# Patient Record
Sex: FEMALE | Race: WHITE | NOT HISPANIC OR LATINO | Employment: OTHER | URBAN - METROPOLITAN AREA
[De-identification: names, ages, dates, MRNs, and addresses within clinical notes are randomized per-mention and may not be internally consistent; named-entity substitution may affect disease eponyms.]

---

## 2023-07-03 LAB
ALANINE AMINOTRANSFERASE (SGPT) (U/L) IN SER/PLAS: 12 U/L (ref 7–45)
ALBUMIN (G/DL) IN SER/PLAS: 4.6 G/DL (ref 3.4–5)
ALKALINE PHOSPHATASE (U/L) IN SER/PLAS: 62 U/L (ref 33–136)
ANION GAP IN SER/PLAS: 11 MMOL/L (ref 10–20)
ASPARTATE AMINOTRANSFERASE (SGOT) (U/L) IN SER/PLAS: 23 U/L (ref 9–39)
BILIRUBIN TOTAL (MG/DL) IN SER/PLAS: 0.6 MG/DL (ref 0–1.2)
CALCIDIOL (25 OH VITAMIN D3) (NG/ML) IN SER/PLAS: 83 NG/ML
CALCIUM (MG/DL) IN SER/PLAS: 10 MG/DL (ref 8.6–10.3)
CARBON DIOXIDE, TOTAL (MMOL/L) IN SER/PLAS: 35 MMOL/L (ref 21–32)
CHLORIDE (MMOL/L) IN SER/PLAS: 93 MMOL/L (ref 98–107)
CHOLESTEROL (MG/DL) IN SER/PLAS: 147 MG/DL (ref 0–199)
CHOLESTEROL IN HDL (MG/DL) IN SER/PLAS: 53.4 MG/DL
CHOLESTEROL/HDL RATIO: 2.8
CREATININE (MG/DL) IN SER/PLAS: 0.78 MG/DL (ref 0.5–1.05)
ESTIMATED AVERAGE GLUCOSE FOR HBA1C: 146 MG/DL
GFR FEMALE: 81 ML/MIN/1.73M2
GLUCOSE (MG/DL) IN SER/PLAS: 106 MG/DL (ref 74–99)
HEMOGLOBIN A1C/HEMOGLOBIN TOTAL IN BLOOD: 6.7 %
LDL: 69 MG/DL (ref 0–99)
POTASSIUM (MMOL/L) IN SER/PLAS: 4.7 MMOL/L (ref 3.5–5.3)
PROTEIN TOTAL: 7.7 G/DL (ref 6.4–8.2)
SODIUM (MMOL/L) IN SER/PLAS: 134 MMOL/L (ref 136–145)
THYROTROPIN (MIU/L) IN SER/PLAS BY DETECTION LIMIT <= 0.05 MIU/L: 1.29 MIU/L (ref 0.44–3.98)
THYROXINE (T4) FREE (NG/DL) IN SER/PLAS: 0.97 NG/DL (ref 0.61–1.12)
TRIGLYCERIDE (MG/DL) IN SER/PLAS: 122 MG/DL (ref 0–149)
UREA NITROGEN (MG/DL) IN SER/PLAS: 15 MG/DL (ref 6–23)
VLDL: 24 MG/DL (ref 0–40)

## 2023-08-02 LAB
ALBUMIN (G/DL) IN SER/PLAS: 4.6 G/DL (ref 3.4–5)
ANION GAP IN SER/PLAS: 12 MMOL/L (ref 10–20)
CALCIUM (MG/DL) IN SER/PLAS: 10 MG/DL (ref 8.6–10.3)
CARBON DIOXIDE, TOTAL (MMOL/L) IN SER/PLAS: 31 MMOL/L (ref 21–32)
CHLORIDE (MMOL/L) IN SER/PLAS: 98 MMOL/L (ref 98–107)
CREATININE (MG/DL) IN SER/PLAS: 0.79 MG/DL (ref 0.5–1.05)
GFR FEMALE: 80 ML/MIN/1.73M2
GLUCOSE (MG/DL) IN SER/PLAS: 111 MG/DL (ref 74–99)
PHOSPHATE (MG/DL) IN SER/PLAS: 4.9 MG/DL (ref 2.5–4.9)
POTASSIUM (MMOL/L) IN SER/PLAS: 4.8 MMOL/L (ref 3.5–5.3)
SODIUM (MMOL/L) IN SER/PLAS: 136 MMOL/L (ref 136–145)
UREA NITROGEN (MG/DL) IN SER/PLAS: 11 MG/DL (ref 6–23)

## 2024-01-03 ENCOUNTER — PATIENT OUTREACH (OUTPATIENT)
Dept: CARDIOLOGY | Facility: CLINIC | Age: 74
End: 2024-01-03
Payer: MEDICARE

## 2024-01-04 PROBLEM — E55.9 VITAMIN D DEFICIENCY: Status: ACTIVE | Noted: 2024-01-04

## 2024-01-04 PROBLEM — E05.20: Status: ACTIVE | Noted: 2024-01-04

## 2024-01-04 PROBLEM — I50.32 CHRONIC DIASTOLIC CONGESTIVE HEART FAILURE (MULTI): Status: ACTIVE | Noted: 2024-01-04

## 2024-01-04 PROBLEM — Z97.4 HEARING AID WORN: Status: ACTIVE | Noted: 2024-01-04

## 2024-01-04 PROBLEM — K21.9 GERD (GASTROESOPHAGEAL REFLUX DISEASE): Status: ACTIVE | Noted: 2024-01-04

## 2024-01-04 PROBLEM — M85.88 OSTEOPENIA OF LUMBAR SPINE: Status: ACTIVE | Noted: 2024-01-04

## 2024-01-04 PROBLEM — E53.8 VITAMIN B12 DEFICIENCY: Status: ACTIVE | Noted: 2024-01-04

## 2024-01-04 PROBLEM — E03.9 HYPOTHYROIDISM: Status: ACTIVE | Noted: 2024-01-04

## 2024-01-04 PROBLEM — R42 DIZZINESS: Status: ACTIVE | Noted: 2024-01-04

## 2024-01-04 PROBLEM — E78.2 MIXED HYPERLIPIDEMIA: Status: ACTIVE | Noted: 2024-01-04

## 2024-01-04 PROBLEM — I07.1 MODERATE TRICUSPID REGURGITATION: Status: ACTIVE | Noted: 2024-01-04

## 2024-01-04 PROBLEM — H92.09 EAR PAIN: Status: ACTIVE | Noted: 2024-01-04

## 2024-01-04 PROBLEM — D50.9 IRON DEFICIENCY ANEMIA: Status: ACTIVE | Noted: 2024-01-04

## 2024-01-04 PROBLEM — R73.9 HYPERGLYCEMIA: Status: ACTIVE | Noted: 2024-01-04

## 2024-01-04 PROBLEM — E11.40 CONTROLLED TYPE 2 DIABETES MELLITUS WITH DIABETIC NEUROPATHY, WITHOUT LONG-TERM CURRENT USE OF INSULIN (MULTI): Status: ACTIVE | Noted: 2024-01-04

## 2024-01-04 PROBLEM — M81.0 OSTEOPOROSIS: Status: ACTIVE | Noted: 2024-01-04

## 2024-01-04 PROBLEM — E04.1 THYROID CYST: Status: ACTIVE | Noted: 2024-01-04

## 2024-01-04 PROBLEM — R94.31 ABNORMAL EKG: Status: ACTIVE | Noted: 2024-01-04

## 2024-01-04 PROBLEM — I34.0 MODERATE MITRAL REGURGITATION: Status: ACTIVE | Noted: 2024-01-04

## 2024-01-04 PROBLEM — H91.90 HEARING LOSS: Status: ACTIVE | Noted: 2024-01-04

## 2024-01-04 PROBLEM — H66.002 NON-RECURRENT ACUTE SUPPURATIVE OTITIS MEDIA OF LEFT EAR WITHOUT SPONTANEOUS RUPTURE OF TYMPANIC MEMBRANE: Status: ACTIVE | Noted: 2024-01-04

## 2024-01-04 PROBLEM — I25.10 CORONARY ARTERY DISEASE: Status: ACTIVE | Noted: 2024-01-04

## 2024-01-04 PROBLEM — E87.5 HYPERKALEMIA: Status: ACTIVE | Noted: 2024-01-04

## 2024-01-04 PROBLEM — I48.0 PAROXYSMAL ATRIAL FIBRILLATION (MULTI): Status: ACTIVE | Noted: 2024-01-04

## 2024-01-04 PROBLEM — E04.1 THYROID NODULE: Status: ACTIVE | Noted: 2024-01-04

## 2024-01-05 NOTE — PROGRESS NOTES
Subjective   Patient ID: Reyna Jones is a 73 y.o. female who presents for New Patient Visit, Shortness of Breath, COPD, and Atrial Fibrillation. Patient's daughter is with her today.     HPI   Patient is here to establish care. She was last seen by Dr. Narvaez on 11/16/2022.     Was admitted into the hospital on 12/28. She went in for elevated heart rate. She states that some of her medications were changed at the hospital. Since being discharged, she has to push herself more to breathe, speak, etc. She uses a nebulizer 3-4 times daily and was told to use her inhaler afterwards.     Question about seeing a lung specialist. She was a smoker for 35 years and smoked 1 PPD. She quit in 2017 then started smoking again a few years after.     She mentions that she has felt like her tongue is burnt for the past few days.     Review of Systems  Except positives as noted in the CC & HPI      Constitutional: Denies fevers, chills, night sweats, fatigue, weight changes, change in appetite    Eyes: Denies blurry vision, double vision    ENT: Denies otalgia, trouble hearing, tinnitus, vertigo, nasal congestion, rhinorrhea, sore throat    Neck: Denies swelling, masses    Cardiovascular: Denies chest pain, palpitations, edema, orthopnea, syncope    Respiratory: Denies dyspnea, cough, wheezing, postural nocturnal dyspnea    Gastrointestinal: Denies abdominal pain, nausea, vomiting, diarrhea, constipation, melena, hematochezia    Genitourinary: Denies dysuria, hematuria, frequency, urgency    Musculoskeletal: Denies back pain, neck pain, arthralgias, myalgias    Integumentary: Denies skin lesions, rashes, masses    Neurological: Denies dizziness, headaches, confusion, limb weakness, paresthesias, syncope, convulsions    Psychiatric: Denies depression, anxiety, homicidal ideations, suicidal ideations, sleep disturbances    Endocrine: Denies polyphagia, polydipsia, polyuria, weakness, hair thinning, heat intolerance, cold  "intolerance, weight changes    Heme/Lymph: Denies easy bruising, easy bleeding, swollen glands    Objective   /67 (BP Location: Left arm, BP Cuff Size: Adult)   Pulse 65   Temp 36.5 °C (97.7 °F) (Temporal)   Ht 1.594 m (5' 2.75\")   Wt 62.5 kg (137 lb 12.8 oz)   SpO2 97%   BMI 24.61 kg/m²     Physical Exam  90/56 on recheck of BP in the right arm.     Gen. Appearance - well-developed, well-nourished, 73 y.o., White female in no acute distress.     Skin - warm, pink and dry without rash or concerning lesions.     Mental Status - alert and oriented times 3. Normal mood and affect appropriate to mood.     Mouth - pharynx is pink without exudates. Dentition is normal appearing. Tongue and uvula move in the midline. Some white spots around the perimeter of the tongue.     Neck - supple without lymphadenopathy. Carotid pulses are normal without bruits. Thyroid is normal in midline without nodules.    Chest - lungs are clear to auscultation without rales, rhonchi or wheezes. Diminished breath sounds at the bases bilaterally.     Heart - regular, rate, and rhythm without murmurs, rubs or gallops.     Abdomen - soft, flat, nontender, nondistended. No masses, hepatomegaly or splenomegaly is noted. No rebound, rigidity or guarding is noted. Bowel sounds are normoactive.     Extremities - no cyanosis, clubbing or edema. Pedal pulses are 2+ normal at the dorsalis pedis and posterior tibial pulses bilaterally.     Neurological - cranial nerves II through XII are grossly intact. Motor strength 5/5 at all fours.     Assessment/Plan   1. Encounter to establish care        2. Controlled type 2 diabetes mellitus with diabetic neuropathy, without long-term current use of insulin (CMS/HCC)  Follow Up In [facility] - PCP - Established      3. Oral thrush  clotrimazole (Mycelex) 10 mg dinah      4. Panlobular emphysema (CMS/HCC)  Referral to Pulmonology      5. Acquired hypothyroidism  Follow Up In [facility] - PCP - Established      6. Mixed hyperlipidemia  Follow Up In [facility] - PCP - Established      7. Paroxysmal atrial fibrillation (CMS/HCC)        8. Gastroesophageal reflux disease without esophagitis        9. Osteopenia of lumbar spine        10. Body mass index (BMI) of 24.0 to 24.9 in adult        11. Encounter for screening mammogram for malignant neoplasm of breast  BI mammo bilateral screening tomosynthesis      12. Chronic diastolic congestive heart failure (CMS/HCC)        Patient will continue on a diabetic, low-cholesterol diet and weight reduction. Exercise as tolerated. She will continue medications as prescribed. Follow-up in 3 month(s) otherwise as needed.     Patient was started on:   Clotrimazole 10 mg Anny, DISSOLVE IN MOUTH FIVE TIMES DAILY FOR 14 DAYS     Rx(s) sent to pharmacy.     Mammogram ordered for yearly breast cancer screening.     Refer patient to Dr. Ovalles for further evaluation and treatment of COPD.     Patient is to follow up with Dr. Mobley (cardio), Dr. Sanon (endo) as scheduled.     Consider PFT in the future. May be done by Dr. Ovalles.         Brock Attestation  By signing my name below, IDionne Scribe   attest that this documentation has been prepared under the direction and in the presence of Jeffery Ayoub MD.

## 2024-01-09 ENCOUNTER — OFFICE VISIT (OUTPATIENT)
Dept: PRIMARY CARE | Facility: CLINIC | Age: 74
End: 2024-01-09
Payer: MEDICARE

## 2024-01-09 VITALS
HEIGHT: 63 IN | HEART RATE: 65 BPM | SYSTOLIC BLOOD PRESSURE: 102 MMHG | WEIGHT: 137.8 LBS | OXYGEN SATURATION: 97 % | TEMPERATURE: 97.7 F | BODY MASS INDEX: 24.41 KG/M2 | DIASTOLIC BLOOD PRESSURE: 67 MMHG

## 2024-01-09 DIAGNOSIS — E03.9 ACQUIRED HYPOTHYROIDISM: ICD-10-CM

## 2024-01-09 DIAGNOSIS — M85.88 OSTEOPENIA OF LUMBAR SPINE: ICD-10-CM

## 2024-01-09 DIAGNOSIS — I48.0 PAROXYSMAL ATRIAL FIBRILLATION (MULTI): ICD-10-CM

## 2024-01-09 DIAGNOSIS — Z12.31 ENCOUNTER FOR SCREENING MAMMOGRAM FOR MALIGNANT NEOPLASM OF BREAST: ICD-10-CM

## 2024-01-09 DIAGNOSIS — E11.40 CONTROLLED TYPE 2 DIABETES MELLITUS WITH DIABETIC NEUROPATHY, WITHOUT LONG-TERM CURRENT USE OF INSULIN (MULTI): ICD-10-CM

## 2024-01-09 DIAGNOSIS — K21.9 GASTROESOPHAGEAL REFLUX DISEASE WITHOUT ESOPHAGITIS: ICD-10-CM

## 2024-01-09 DIAGNOSIS — J43.1 PANLOBULAR EMPHYSEMA (MULTI): ICD-10-CM

## 2024-01-09 DIAGNOSIS — Z76.89 ENCOUNTER TO ESTABLISH CARE: Primary | ICD-10-CM

## 2024-01-09 DIAGNOSIS — E78.2 MIXED HYPERLIPIDEMIA: ICD-10-CM

## 2024-01-09 DIAGNOSIS — I50.32 CHRONIC DIASTOLIC CONGESTIVE HEART FAILURE (MULTI): ICD-10-CM

## 2024-01-09 DIAGNOSIS — B37.0 ORAL THRUSH: ICD-10-CM

## 2024-01-09 PROCEDURE — 3078F DIAST BP <80 MM HG: CPT | Performed by: FAMILY MEDICINE

## 2024-01-09 PROCEDURE — 99215 OFFICE O/P EST HI 40 MIN: CPT | Performed by: FAMILY MEDICINE

## 2024-01-09 PROCEDURE — 3008F BODY MASS INDEX DOCD: CPT | Performed by: FAMILY MEDICINE

## 2024-01-09 PROCEDURE — 3074F SYST BP LT 130 MM HG: CPT | Performed by: FAMILY MEDICINE

## 2024-01-09 PROCEDURE — 1159F MED LIST DOCD IN RCRD: CPT | Performed by: FAMILY MEDICINE

## 2024-01-09 PROCEDURE — 1160F RVW MEDS BY RX/DR IN RCRD: CPT | Performed by: FAMILY MEDICINE

## 2024-01-09 PROCEDURE — 1036F TOBACCO NON-USER: CPT | Performed by: FAMILY MEDICINE

## 2024-01-09 RX ORDER — FUROSEMIDE 40 MG/1
40 TABLET ORAL DAILY
COMMUNITY
End: 2024-01-16 | Stop reason: SDUPTHER

## 2024-01-09 RX ORDER — FLUTICASONE PROPIONATE 50 MCG
1 SPRAY, SUSPENSION (ML) NASAL DAILY
COMMUNITY
End: 2024-03-26 | Stop reason: HOSPADM

## 2024-01-09 RX ORDER — PREDNISONE 10 MG/1
10 TABLET ORAL DAILY
COMMUNITY
End: 2024-01-09 | Stop reason: ALTCHOICE

## 2024-01-09 RX ORDER — LEVALBUTEROL INHALATION SOLUTION 1.25 MG/3ML
1 SOLUTION RESPIRATORY (INHALATION)
COMMUNITY
End: 2024-02-05

## 2024-01-09 RX ORDER — ALBUTEROL SULFATE 90 UG/1
2 AEROSOL, METERED RESPIRATORY (INHALATION) EVERY 6 HOURS PRN
COMMUNITY

## 2024-01-09 RX ORDER — CLOTRIMAZOLE 10 MG/1
10 LOZENGE ORAL; TOPICAL
Qty: 70 TROCHE | Refills: 0 | Status: SHIPPED | OUTPATIENT
Start: 2024-01-09 | End: 2024-01-19

## 2024-01-09 RX ORDER — ROSUVASTATIN CALCIUM 10 MG/1
10 TABLET, COATED ORAL DAILY
COMMUNITY

## 2024-01-09 RX ORDER — METFORMIN HYDROCHLORIDE EXTENDED-RELEASE TABLETS 500 MG/1
500 TABLET, FILM COATED, EXTENDED RELEASE ORAL
COMMUNITY

## 2024-01-09 RX ORDER — METOPROLOL SUCCINATE 50 MG/1
50 TABLET, EXTENDED RELEASE ORAL 2 TIMES DAILY
COMMUNITY

## 2024-01-09 RX ORDER — IPRATROPIUM BROMIDE 17 UG/1
2 AEROSOL, METERED RESPIRATORY (INHALATION)
COMMUNITY

## 2024-01-09 RX ORDER — LEVOTHYROXINE SODIUM 75 UG/1
75 TABLET ORAL
COMMUNITY
End: 2024-02-04 | Stop reason: SDUPTHER

## 2024-01-09 NOTE — PATIENT INSTRUCTIONS
Patient will continue on a diabetic, low-cholesterol diet and weight reduction. Exercise as tolerated. She will continue medications as prescribed. Follow-up in 3 month(s) otherwise as needed.     Patient was started on:   Clotrimazole 10 mg Anny, DISSOLVE IN MOUTH FIVE TIMES DAILY FOR 14 DAYS     Rx(s) sent to pharmacy.     Mammogram ordered for yearly breast cancer screening.     Refer patient to Dr. Ovalles for further evaluation and treatment of COPD.     Patient is to follow up with Dr. Mobley (cardio), Dr. Sanon (endo) as scheduled.

## 2024-01-10 NOTE — ASSESSMENT & PLAN NOTE
Chronic diastolic congestive heart failure is stable.  Patient to continue with current medications and treatment plan.  Follow-up at least annually.

## 2024-01-11 ENCOUNTER — ANCILLARY PROCEDURE (OUTPATIENT)
Dept: RADIOLOGY | Facility: CLINIC | Age: 74
End: 2024-01-11
Payer: MEDICARE

## 2024-01-11 DIAGNOSIS — Z12.31 ENCOUNTER FOR SCREENING MAMMOGRAM FOR MALIGNANT NEOPLASM OF BREAST: ICD-10-CM

## 2024-01-11 DIAGNOSIS — J43.1 PANLOBULAR EMPHYSEMA (MULTI): Primary | ICD-10-CM

## 2024-01-11 PROCEDURE — 77063 BREAST TOMOSYNTHESIS BI: CPT | Performed by: RADIOLOGY

## 2024-01-11 PROCEDURE — 77063 BREAST TOMOSYNTHESIS BI: CPT

## 2024-01-11 PROCEDURE — 77067 SCR MAMMO BI INCL CAD: CPT | Performed by: RADIOLOGY

## 2024-01-11 RX ORDER — IPRATROPIUM BROMIDE 0.5 MG/2.5ML
SOLUTION RESPIRATORY (INHALATION)
Qty: 75 ML | Refills: 2 | Status: SHIPPED | OUTPATIENT
Start: 2024-01-11 | End: 2024-01-16 | Stop reason: SDUPTHER

## 2024-01-11 NOTE — TELEPHONE ENCOUNTER
Rx Refill Request     Name: Reyna Jones  :  1950   Medication Name:    IPratropium bromide 0.02 % solution for inhalation (Atrovent)     Last fill: N/A ?  Specific Pharmacy location:  [facility]  Date of last appointment:  2024   Date of next appointment:  2024   Best number to reach patient:  [phone]       RX PENDED to [facility] as directed by Electronic Correspondence

## 2024-01-12 PROBLEM — E11.9 TYPE 2 DIABETES MELLITUS (MULTI): Status: ACTIVE | Noted: 2024-01-04

## 2024-01-12 PROBLEM — B37.0 CANDIDIASIS OF MOUTH: Status: ACTIVE | Noted: 2024-01-12

## 2024-01-16 ENCOUNTER — OFFICE VISIT (OUTPATIENT)
Dept: CARDIOLOGY | Facility: CLINIC | Age: 74
End: 2024-01-16
Payer: MEDICARE

## 2024-01-16 VITALS
DIASTOLIC BLOOD PRESSURE: 62 MMHG | WEIGHT: 142 LBS | HEIGHT: 64 IN | SYSTOLIC BLOOD PRESSURE: 116 MMHG | BODY MASS INDEX: 24.24 KG/M2 | HEART RATE: 67 BPM

## 2024-01-16 DIAGNOSIS — I50.32 CHRONIC DIASTOLIC CONGESTIVE HEART FAILURE (MULTI): Primary | ICD-10-CM

## 2024-01-16 DIAGNOSIS — I48.0 PAROXYSMAL ATRIAL FIBRILLATION (MULTI): ICD-10-CM

## 2024-01-16 DIAGNOSIS — I25.10 CORONARY ARTERY DISEASE INVOLVING NATIVE CORONARY ARTERY OF NATIVE HEART WITHOUT ANGINA PECTORIS: ICD-10-CM

## 2024-01-16 DIAGNOSIS — J43.1 PANLOBULAR EMPHYSEMA (MULTI): ICD-10-CM

## 2024-01-16 PROCEDURE — 3074F SYST BP LT 130 MM HG: CPT | Performed by: PHYSICIAN ASSISTANT

## 2024-01-16 PROCEDURE — 3078F DIAST BP <80 MM HG: CPT | Performed by: PHYSICIAN ASSISTANT

## 2024-01-16 PROCEDURE — 99214 OFFICE O/P EST MOD 30 MIN: CPT | Performed by: PHYSICIAN ASSISTANT

## 2024-01-16 PROCEDURE — 1036F TOBACCO NON-USER: CPT | Performed by: PHYSICIAN ASSISTANT

## 2024-01-16 PROCEDURE — 1159F MED LIST DOCD IN RCRD: CPT | Performed by: PHYSICIAN ASSISTANT

## 2024-01-16 PROCEDURE — 3008F BODY MASS INDEX DOCD: CPT | Performed by: PHYSICIAN ASSISTANT

## 2024-01-16 RX ORDER — IPRATROPIUM BROMIDE 0.5 MG/2.5ML
0.5 SOLUTION RESPIRATORY (INHALATION) 3 TIMES DAILY
Qty: 90 ML | Refills: 2 | Status: SHIPPED | OUTPATIENT
Start: 2024-01-16 | End: 2024-02-05

## 2024-01-16 RX ORDER — FUROSEMIDE 40 MG/1
40 TABLET ORAL DAILY
Qty: 30 TABLET | Refills: 11 | Status: SHIPPED | OUTPATIENT
Start: 2024-01-16

## 2024-01-16 NOTE — PROGRESS NOTES
"Chief Complaint:   Hospital Follow-up (Patient is here today for a hospital follow up )     History Of Present Illness:    Reyna Jones is a 73 y.o. female presenting for hospital discharge follow up.  Patient was hospitalized in late 12/2023 with progressive exertional dyspnea and acute on chronic diastolic heart failure with acute hypoxic respiratory failure.  Patient was diuresed and eventually transitioned to furosemide 40mg daily which she continues.  Upon arrival patient was noted to be in rapid atrial fibrillation, therefore Xarelto 20mg daily was added to her regimen as well as increase in metoprolol succinate dose.  Patient feels back to her functional baseline at this time.  She is scheduled to establish care with pulmonologist in 3 weeks.  Patient denies chest pain, chest pressure, palpitations, dyspnea on exertion, shortness of breath at rest, diaphoresis, nausea/vomiting, back pain, headache, lightheadedness, dizziness, syncope or presyncopal episodes, active bleeding signs or symptoms, excessive weight gain, muscle or joint pain, claudication.       Last Recorded Vitals:  Vitals:    01/16/24 1504   BP: 116/62   BP Location: Left arm   Patient Position: Sitting   BP Cuff Size: Adult   Pulse: 67   Weight: 64.4 kg (142 lb)   Height: 1.626 m (5' 4\")       Past Medical History:  She has a past medical history of Body mass index (BMI) 25.0-25.9, adult (06/10/2019), Encounter for immunization (09/29/2017), Gastrointestinal hemorrhage, unspecified (02/28/2017), Other conditions influencing health status (12/31/2018), Personal history of nicotine dependence, Personal history of nicotine dependence, Personal history of other diseases of the circulatory system (04/26/2019), Personal history of other diseases of the digestive system (04/21/2017), Personal history of other diseases of the respiratory system (11/03/2017), Personal history of other diseases of the respiratory system, Unspecified conjunctivitis " (10/07/2013), and Unspecified otitis externa, left ear (09/09/2014).    Past Surgical History:  She has a past surgical history that includes Other surgical history (10/07/2013); Other surgical history (12/04/2021); Other surgical history (12/04/2021); and Tonsillectomy (09/08/2014).      Social History:  She reports that she has never smoked. She has never used smokeless tobacco. No history on file for alcohol use and drug use.    Family History:  No family history on file.     Allergies:  Propoxyphene n-acetaminophen and Propoxyphene-acetaminophen    Outpatient Medications:  Current Outpatient Medications   Medication Instructions    albuterol (ProAir HFA) 90 mcg/actuation inhaler 2 puffs, inhalation, Every 6 hours PRN    clotrimazole (MYCELEX) 10 mg, oral, 5 times daily    fluticasone (Flonase) 50 mcg/actuation nasal spray 1 spray, Each Nostril, Daily, Shake gently. Before first use, prime pump. After use, clean tip and replace cap.    furosemide (LASIX) 40 mg, oral, Daily    ipratropium (Atrovent HFA) 17 mcg/actuation inhaler 2 puffs, inhalation, 4 times daily RT    ipratropium (Atrovent) 0.02 % nebulizer solution INHALE ONE VIAL BY NEBULIZER THREE TIMES DAILY    levalbuterol (Xopenex) 1.25 mg/3 mL nebulizer solution 1 ampule, nebulization, 3 times daily RT    levothyroxine (SYNTHROID, LEVOXYL) 75 mcg, oral, Daily before breakfast    metFORMIN (OSM) (FORTAMET) 500 mg, oral, Daily with evening meal, Do not crush, chew, or split.    metoprolol succinate XL (TOPROL-XL) 50 mg, oral, Daily, Do not crush or chew.    pantoprazole (PROTONIX) 40 mg, oral, Daily    rivaroxaban (Xarelto) 20 mg tablet oral, Take with food.    rosuvastatin (CRESTOR) 10 mg, oral, Daily       Physical Exam:  Constitutional: awake and alert, oriented ×3, no apparent distress  Skin: warm, dry, good turgor no obvious lesions  Eyes: pupils equal, round, reactive to light, conjunctiva pink and noninjected, no discharge  HENT: normocephalic and  "atraumatic, mucous membranes moist, trachea midline with no masses/goiter  Cardiovascular: S1/S2 irregular, no murmur no rubs/gallops, no carotid bruits, no JVD  Pulmonary: symmetrical chest expansion, lungs are clear to auscultation bilaterally, no wheezes/rales/rhonchi, normal effort  Abdomen: nontender, nondistended, active bowel sounds, no ascites  Extremities: no cyanosis, clubbing, no LE edema no lesions; palpable pedal pulses  Neurologic: cranial nerves II - XII grossly intact, stable gait, no tremor       Last Labs:  CBC -  Lab Results   Component Value Date    WBC 7.6 02/09/2023    HGB 12.7 02/09/2023    HCT 39.2 02/09/2023    MCV 92 02/09/2023     02/09/2023       CMP -  Lab Results   Component Value Date    CALCIUM 10.0 08/02/2023    PHOS 4.9 08/02/2023    PROT 7.7 07/03/2023    ALBUMIN 4.6 08/02/2023    AST 23 07/03/2023    ALT 12 07/03/2023    ALKPHOS 62 07/03/2023    BILITOT 0.6 07/03/2023       LIPID PANEL -   Lab Results   Component Value Date    CHOL 147 07/03/2023    TRIG 122 07/03/2023    HDL 53.4 07/03/2023    CHHDL 2.8 07/03/2023    LDLF 69 07/03/2023    VLDL 24 07/03/2023       RENAL FUNCTION PANEL -   Lab Results   Component Value Date    GLUCOSE 111 (H) 08/02/2023     08/02/2023    K 4.8 08/02/2023    CL 98 08/02/2023    CO2 31 08/02/2023    ANIONGAP 12 08/02/2023    BUN 11 08/02/2023    CREATININE 0.79 08/02/2023    CALCIUM 10.0 08/02/2023    PHOS 4.9 08/02/2023    ALBUMIN 4.6 08/02/2023        Lab Results   Component Value Date    HGBA1C 6.7 (A) 07/03/2023       Last Cardiology Tests:  ECG:  No results found for this or any previous visit from the past 1095 days.      Echo:  No results found for this or any previous visit from the past 1095 days.      Ejection Fractions:  No results found for: \"EF\"    Cath:  No results found for this or any previous visit from the past 1095 days.      Stress Test:  No results found for this or any previous visit from the past 1095 " days.      Cardiac Imaging:  No results found for this or any previous visit from the past 1095 days.      Assessment/Plan   Problem List Items Addressed This Visit             ICD-10-CM       Cardiac and Vasculature    Chronic diastolic congestive heart failure (CMS/HCC) - Primary I50.32    Relevant Medications    furosemide (Lasix) 40 mg tablet    Coronary artery disease I25.10    Paroxysmal atrial fibrillation (CMS/HCC) I48.0       Pulmonary and Pneumonias    Panlobular emphysema (CMS/HCC) J43.1    Relevant Medications    ipratropium (Atrovent) 0.02 % nebulizer solution     -Continue furosemide 40mg daily as patient appears comfortable and euvolemic on exam today    -Xarelto 20mg daily for CVA prophylaxis    -F/U Dr. Mobley in 6 weeks    CRYSTAL GutierrezC

## 2024-01-24 ENCOUNTER — APPOINTMENT (OUTPATIENT)
Dept: CARDIOLOGY | Facility: CLINIC | Age: 74
End: 2024-01-24
Payer: MEDICARE

## 2024-01-30 ENCOUNTER — LAB (OUTPATIENT)
Dept: LAB | Facility: LAB | Age: 74
End: 2024-01-30
Payer: MEDICARE

## 2024-01-30 DIAGNOSIS — E55.9 VITAMIN D DEFICIENCY, UNSPECIFIED: ICD-10-CM

## 2024-01-30 DIAGNOSIS — E78.5 HYPERLIPIDEMIA, UNSPECIFIED: ICD-10-CM

## 2024-01-30 DIAGNOSIS — I10 ESSENTIAL (PRIMARY) HYPERTENSION: ICD-10-CM

## 2024-01-30 DIAGNOSIS — E11.9 TYPE 2 DIABETES MELLITUS WITHOUT COMPLICATIONS (MULTI): Primary | ICD-10-CM

## 2024-01-30 LAB
25(OH)D3 SERPL-MCNC: 48 NG/ML (ref 30–100)
ALBUMIN SERPL BCP-MCNC: 4.3 G/DL (ref 3.4–5)
ALP SERPL-CCNC: 72 U/L (ref 33–136)
ALT SERPL W P-5'-P-CCNC: 15 U/L (ref 7–45)
ANION GAP SERPL CALC-SCNC: 15 MMOL/L (ref 10–20)
AST SERPL W P-5'-P-CCNC: 29 U/L (ref 9–39)
BILIRUB SERPL-MCNC: 0.6 MG/DL (ref 0–1.2)
BUN SERPL-MCNC: 14 MG/DL (ref 6–23)
CALCIUM SERPL-MCNC: 9.2 MG/DL (ref 8.6–10.3)
CHLORIDE SERPL-SCNC: 95 MMOL/L (ref 98–107)
CO2 SERPL-SCNC: 31 MMOL/L (ref 21–32)
CREAT SERPL-MCNC: 0.87 MG/DL (ref 0.5–1.05)
EGFRCR SERPLBLD CKD-EPI 2021: 71 ML/MIN/1.73M*2
EST. AVERAGE GLUCOSE BLD GHB EST-MCNC: 146 MG/DL
GLUCOSE SERPL-MCNC: 105 MG/DL (ref 74–99)
HBA1C MFR BLD: 6.7 %
POTASSIUM SERPL-SCNC: 4.8 MMOL/L (ref 3.5–5.3)
PROT SERPL-MCNC: 6.9 G/DL (ref 6.4–8.2)
SODIUM SERPL-SCNC: 136 MMOL/L (ref 136–145)
T4 FREE SERPL-MCNC: 0.97 NG/DL (ref 0.61–1.12)
TSH SERPL-ACNC: 4.79 MIU/L (ref 0.44–3.98)

## 2024-01-30 PROCEDURE — 83036 HEMOGLOBIN GLYCOSYLATED A1C: CPT

## 2024-01-30 PROCEDURE — 84439 ASSAY OF FREE THYROXINE: CPT

## 2024-01-30 PROCEDURE — 80053 COMPREHEN METABOLIC PANEL: CPT

## 2024-01-30 PROCEDURE — 84443 ASSAY THYROID STIM HORMONE: CPT

## 2024-01-30 PROCEDURE — 82306 VITAMIN D 25 HYDROXY: CPT

## 2024-01-30 PROCEDURE — 36415 COLL VENOUS BLD VENIPUNCTURE: CPT

## 2024-02-03 DIAGNOSIS — J43.1 PANLOBULAR EMPHYSEMA (MULTI): Primary | ICD-10-CM

## 2024-02-04 DIAGNOSIS — E03.9 ACQUIRED HYPOTHYROIDISM: Primary | ICD-10-CM

## 2024-02-04 RX ORDER — LEVOTHYROXINE SODIUM 88 UG/1
88 TABLET ORAL
Qty: 90 TABLET | Refills: 3 | Status: ON HOLD | OUTPATIENT
Start: 2024-02-04 | End: 2024-03-26 | Stop reason: SDUPTHER

## 2024-02-04 NOTE — RESULT ENCOUNTER NOTE
Please call the patient regarding her abnormal result.  Hemoglobin A1c is in good range at 6.7 and BS of 105.  Patient is to continue diabetic diet.    Thyroid testing is abnormal with a TSH of 4.79.  Patient is to change her levothyroxine to 88 mcg by mouth daily.  Recommend repeat TSH with reflex to free T4 in 3 months.  Vitamin D level is normal at 48.

## 2024-02-05 RX ORDER — IPRATROPIUM BROMIDE 0.5 MG/2.5ML
SOLUTION RESPIRATORY (INHALATION)
Qty: 75 ML | Refills: 4 | Status: SHIPPED | OUTPATIENT
Start: 2024-02-05 | End: 2024-03-26 | Stop reason: HOSPADM

## 2024-02-05 RX ORDER — LEVALBUTEROL INHALATION SOLUTION 1.25 MG/3ML
SOLUTION RESPIRATORY (INHALATION)
Qty: 270 ML | Refills: 4 | Status: SHIPPED | OUTPATIENT
Start: 2024-02-05 | End: 2024-03-26 | Stop reason: HOSPADM

## 2024-03-04 ENCOUNTER — APPOINTMENT (OUTPATIENT)
Dept: RESPIRATORY THERAPY | Facility: HOSPITAL | Age: 74
End: 2024-03-04
Payer: MEDICARE

## 2024-03-12 ENCOUNTER — HOSPITAL ENCOUNTER (OUTPATIENT)
Dept: RESPIRATORY THERAPY | Facility: HOSPITAL | Age: 74
Discharge: HOME | End: 2024-03-12
Payer: MEDICARE

## 2024-03-12 DIAGNOSIS — J44.9 CHRONIC OBSTRUCTIVE PULMONARY DISEASE, UNSPECIFIED (MULTI): ICD-10-CM

## 2024-03-12 DIAGNOSIS — R06.00 DYSPNEA, UNSPECIFIED: ICD-10-CM

## 2024-03-12 PROBLEM — E05.20 TOXIC NODULAR GOITER: Status: ACTIVE | Noted: 2024-01-04

## 2024-03-12 LAB
ANION GAP BLDA CALCULATED.4IONS-SCNC: 7 MMO/L (ref 10–25)
ARTERIAL PATENCY WRIST A: POSITIVE
BASE EXCESS BLDA CALC-SCNC: 6.1 MMOL/L (ref -2–3)
BODY TEMPERATURE: ABNORMAL
CA-I BLDA-SCNC: 1.22 MMOL/L (ref 1.1–1.33)
CHLORIDE BLDA-SCNC: 102 MMOL/L (ref 98–107)
GLUCOSE BLDA-MCNC: 123 MG/DL (ref 74–99)
HCO3 BLDA-SCNC: 31.2 MMOL/L (ref 22–26)
HCT VFR BLD EST: 41 % (ref 36–46)
HGB BLDA-MCNC: 13.5 G/DL (ref 12–16)
INHALED O2 CONCENTRATION: 21 %
LACTATE BLDA-SCNC: 1 MMOL/L (ref 0.4–2)
MGC ASCENT PFT - FEV1 - PRE: 1.22
MGC ASCENT PFT - FEV1 - PREDICTED: 2.1
MGC ASCENT PFT - FVC - PRE: 1.98
MGC ASCENT PFT - FVC - PREDICTED: 2.71
OXYHGB MFR BLDA: 93.6 % (ref 94–98)
PCO2 BLDA: 46 MM HG (ref 38–42)
PH BLDA: 7.44 PH (ref 7.38–7.42)
PO2 BLDA: 80 MM HG (ref 85–95)
POTASSIUM BLDA-SCNC: 4 MMOL/L (ref 3.5–5.3)
SAO2 % BLDA: 97 % (ref 94–100)
SODIUM BLDA-SCNC: 136 MMOL/L (ref 136–145)
SPECIMEN DRAWN FROM PATIENT: ABNORMAL

## 2024-03-12 PROCEDURE — 94726 PLETHYSMOGRAPHY LUNG VOLUMES: CPT

## 2024-03-12 PROCEDURE — 36600 WITHDRAWAL OF ARTERIAL BLOOD: CPT

## 2024-03-12 PROCEDURE — 84132 ASSAY OF SERUM POTASSIUM: CPT | Performed by: INTERNAL MEDICINE

## 2024-03-18 NOTE — PROGRESS NOTES
Subjective   Reyna Jones is a 74 y.o. female.    Chief Complaint:  Hospital follow-up for congestive heart failure.    HPI    She presented on December 28, 2023 to [facility] with shortness of breath and palpitations.  She was having gradual onset of increasing shortness of breath.  She was noted to be hypoxic in the emergency room.  She was admitted to the hospital and treated with IV diuretics.  Her dose of metoprolol was increased.  She was continued on Xarelto.  Since discharge she has felt better.  Shortness of breath is significantly improved.  Denies palpitations.  Gets fatigued very easily.    In 2022 she presented with atrial flutter with a rapid ventricular response. She underwent cardioversion at 50 J which was successful.     Her diagnoses are coronary artery disease is based on the finding of a positive calcium score of 848. This was done in 2019.     She presented on July 10, 2019 with gastrointestinal bleeding. She presented with a hemoglobin of 6.0. She was treated with transfusions. She is noted to be in sinus rhythm. She had upper and lower endoscopy which did not reveal a bleeding source.     An echocardiographic study performed on July 11, 2019 demonstrated normal left ventricular systolic function. Left atrial size was essentially normal. There was significant valvular disease with moderate mitral regurgitation and moderate tricuspid regurgitation.     The patient presented to [facility] on December 20, 2018 with shortness of breath and dyspnea. She is noted to be in atrial fibrillation with a rapid ventricular response.      Past medical history significant for hypothyroidism and a gastroesophageal reflux condition.     Allergies  Medication    · Darvocet-N 100 TABS   Recorded By: Fadumo Parrish; 9/8/2014 10:06:22 AM     Family History  Mother    · Family history of cardiac disorder (V17.49) (Z82.49)  Father    · Family history of malignant neoplasm (V16.9)  (Z80.9)  Son    · Family history of hypertension (V17.49) (Z82.49)  Sister    · Family history of rheumatic fever (V17.49) (Z82.49)  Brother    · Family history of cardiac arrhythmia (V17.49) (Z82.49)   · Family history of cardiac disorder (V17.49) (Z82.49)   · Family history of cerebrovascular accident (CVA) (V17.1) (Z82.3)     Social History  Problems    · Born in [place]   · Caffeine use (V49.89) (Z78.9)   ·    · Exercises regularly   · Former smoker (V15.82) (Z87.891)       Review of Systems   Constitutional: Positive for malaise/fatigue.   Cardiovascular:  Positive for dyspnea on exertion.   Respiratory:  Positive for shortness of breath.    Musculoskeletal:  Positive for arthritis.   All other systems reviewed and are negative.      Visit Vitals  /70 (BP Location: Left arm, Patient Position: Sitting)   Pulse 90   Wt 63.5 kg (140 lb)   SpO2 99%   BMI 24.03 kg/m²   OB Status Postmenopausal   Smoking Status Never   BSA 1.69 m²        Objective     Constitutional:       Appearance: Not in distress.   Neck:      Vascular: JVD normal.   Pulmonary:      Breath sounds: Normal breath sounds.   Cardiovascular:      Normal rate. Irregularly irregular rhythm. Normal S1. Normal S2.       Murmurs: There is a grade 1/6 systolic murmur.      No gallop.    Pulses:     Intact distal pulses.   Edema:     Peripheral edema present.     Pretibial: bilateral 1+ edema of the pretibial area.  Abdominal:      General: There is no distension.      Palpations: Abdomen is soft.   Neurological:      Mental Status: Alert.         Lab Review:   Lab Results   Component Value Date     01/30/2024    K 4.8 01/30/2024    CL 95 (L) 01/30/2024    CO2 31 01/30/2024    BUN 14 01/30/2024    CREATININE 0.87 01/30/2024    GLUCOSE 105 (H) 01/30/2024    CALCIUM 9.2 01/30/2024     Lab Results   Component Value Date    CHOL 147 07/03/2023    TRIG 122 07/03/2023    HDL 53.4 07/03/2023    LDLDIRECT 73 09/10/2021       Assessment:    1.   Atrial fibrillation.  In a controlled ventricular response on today's visit.  Will arrange for another cardioversion.  If she fails another cardioversion we would refer to electrophysiology service for consideration for an ablation procedure.  We discussed the procedure in detail with the patient.  She has had the procedure in the past.    2.  Diastolic congestive heart failure.  No heart failure on today's examination.  Oxygen saturation is 97%.    3.  Coronary artery disease.  Extensive coronary artery disease on the basis of prior evaluations which has included a calcium score of 848.  This will limit our options in terms of antiarrhythmic therapy in the future.    4.  Mitral regurgitation.  Moderate mitral regurgitation.  Latest echo study done on December 29, 2023 also showed normal left ventricular function moderately dilated atrium mild aortic stenosis.

## 2024-03-19 ENCOUNTER — OFFICE VISIT (OUTPATIENT)
Dept: CARDIOLOGY | Facility: CLINIC | Age: 74
End: 2024-03-19
Payer: MEDICARE

## 2024-03-19 VITALS
OXYGEN SATURATION: 99 % | BODY MASS INDEX: 24.03 KG/M2 | DIASTOLIC BLOOD PRESSURE: 70 MMHG | WEIGHT: 140 LBS | HEART RATE: 90 BPM | SYSTOLIC BLOOD PRESSURE: 116 MMHG

## 2024-03-19 DIAGNOSIS — I34.0 MODERATE MITRAL REGURGITATION: ICD-10-CM

## 2024-03-19 DIAGNOSIS — I48.0 PAROXYSMAL ATRIAL FIBRILLATION (MULTI): Primary | ICD-10-CM

## 2024-03-19 DIAGNOSIS — I48.0 PAROXYSMAL ATRIAL FIBRILLATION (MULTI): ICD-10-CM

## 2024-03-19 DIAGNOSIS — I50.32 CHRONIC DIASTOLIC CONGESTIVE HEART FAILURE (MULTI): ICD-10-CM

## 2024-03-19 DIAGNOSIS — I25.10 CORONARY ARTERY DISEASE INVOLVING NATIVE CORONARY ARTERY OF NATIVE HEART WITHOUT ANGINA PECTORIS: ICD-10-CM

## 2024-03-19 DIAGNOSIS — I07.1 MODERATE TRICUSPID REGURGITATION: ICD-10-CM

## 2024-03-19 DIAGNOSIS — E78.2 MIXED HYPERLIPIDEMIA: ICD-10-CM

## 2024-03-19 PROCEDURE — 1160F RVW MEDS BY RX/DR IN RCRD: CPT | Performed by: INTERNAL MEDICINE

## 2024-03-19 PROCEDURE — 3044F HG A1C LEVEL LT 7.0%: CPT | Performed by: INTERNAL MEDICINE

## 2024-03-19 PROCEDURE — 99214 OFFICE O/P EST MOD 30 MIN: CPT | Performed by: INTERNAL MEDICINE

## 2024-03-19 PROCEDURE — 3078F DIAST BP <80 MM HG: CPT | Performed by: INTERNAL MEDICINE

## 2024-03-19 PROCEDURE — 3008F BODY MASS INDEX DOCD: CPT | Performed by: INTERNAL MEDICINE

## 2024-03-19 PROCEDURE — 1036F TOBACCO NON-USER: CPT | Performed by: INTERNAL MEDICINE

## 2024-03-19 PROCEDURE — 1159F MED LIST DOCD IN RCRD: CPT | Performed by: INTERNAL MEDICINE

## 2024-03-19 PROCEDURE — 3074F SYST BP LT 130 MM HG: CPT | Performed by: INTERNAL MEDICINE

## 2024-03-19 ASSESSMENT — ENCOUNTER SYMPTOMS
SHORTNESS OF BREATH: 1
DYSPNEA ON EXERTION: 1

## 2024-03-19 NOTE — PATIENT INSTRUCTIONS
"We are going to arrange for you to have a cardioversion.    If this is not successful, we may then refer you for an \"ablation\" procedure also known as a pulmonary vein isolation procedure.  "

## 2024-03-26 ENCOUNTER — ANESTHESIA EVENT (OUTPATIENT)
Dept: CARDIOLOGY | Facility: HOSPITAL | Age: 74
End: 2024-03-26
Payer: MEDICARE

## 2024-03-26 ENCOUNTER — HOSPITAL ENCOUNTER (OUTPATIENT)
Dept: CARDIOLOGY | Facility: HOSPITAL | Age: 74
Discharge: HOME | End: 2024-03-26

## 2024-03-26 ENCOUNTER — ANESTHESIA (OUTPATIENT)
Dept: CARDIOLOGY | Facility: HOSPITAL | Age: 74
End: 2024-03-26
Payer: MEDICARE

## 2024-03-26 ENCOUNTER — HOSPITAL ENCOUNTER (OUTPATIENT)
Facility: HOSPITAL | Age: 74
Setting detail: OUTPATIENT SURGERY
Discharge: HOME | End: 2024-03-26
Attending: INTERNAL MEDICINE | Admitting: INTERNAL MEDICINE
Payer: MEDICARE

## 2024-03-26 VITALS
SYSTOLIC BLOOD PRESSURE: 108 MMHG | RESPIRATION RATE: 16 BRPM | OXYGEN SATURATION: 98 % | BODY MASS INDEX: 23.71 KG/M2 | DIASTOLIC BLOOD PRESSURE: 67 MMHG | TEMPERATURE: 97.3 F | WEIGHT: 138.89 LBS | HEIGHT: 64 IN | HEART RATE: 126 BPM

## 2024-03-26 DIAGNOSIS — E03.9 ACQUIRED HYPOTHYROIDISM: ICD-10-CM

## 2024-03-26 DIAGNOSIS — I48.0 PAROXYSMAL ATRIAL FIBRILLATION (MULTI): Primary | ICD-10-CM

## 2024-03-26 PROBLEM — I10 ESSENTIAL (PRIMARY) HYPERTENSION: Status: ACTIVE | Noted: 2024-01-30

## 2024-03-26 PROCEDURE — 99100 ANES PT EXTEME AGE<1 YR&>70: CPT | Performed by: ANESTHESIOLOGY

## 2024-03-26 PROCEDURE — 3700000001 HC GENERAL ANESTHESIA TIME - INITIAL BASE CHARGE: Performed by: INTERNAL MEDICINE

## 2024-03-26 PROCEDURE — 7100000009 HC PHASE TWO TIME - INITIAL BASE CHARGE: Performed by: INTERNAL MEDICINE

## 2024-03-26 PROCEDURE — A92960 PR CARDIOVERSION, ELECTIVE;EXTERN: Performed by: ANESTHESIOLOGY

## 2024-03-26 PROCEDURE — 93010 ELECTROCARDIOGRAM REPORT: CPT | Performed by: INTERNAL MEDICINE

## 2024-03-26 PROCEDURE — 2720000007 HC OR 272 NO HCPCS: Performed by: INTERNAL MEDICINE

## 2024-03-26 PROCEDURE — 3700000002 HC GENERAL ANESTHESIA TIME - EACH INCREMENTAL 1 MINUTE: Performed by: INTERNAL MEDICINE

## 2024-03-26 PROCEDURE — A92960 PR CARDIOVERSION, ELECTIVE;EXTERN: Performed by: ANESTHESIOLOGIST ASSISTANT

## 2024-03-26 PROCEDURE — 2500000004 HC RX 250 GENERAL PHARMACY W/ HCPCS (ALT 636 FOR OP/ED): Performed by: ANESTHESIOLOGIST ASSISTANT

## 2024-03-26 PROCEDURE — 92960 CARDIOVERSION ELECTRIC EXT: CPT | Mod: 59 | Performed by: INTERNAL MEDICINE

## 2024-03-26 PROCEDURE — 93005 ELECTROCARDIOGRAM TRACING: CPT | Mod: 59

## 2024-03-26 PROCEDURE — 92960 CARDIOVERSION ELECTRIC EXT: CPT | Performed by: INTERNAL MEDICINE

## 2024-03-26 PROCEDURE — 7100000010 HC PHASE TWO TIME - EACH INCREMENTAL 1 MINUTE: Performed by: INTERNAL MEDICINE

## 2024-03-26 RX ORDER — UBIDECARENONE 75 MG
500 CAPSULE ORAL DAILY
COMMUNITY

## 2024-03-26 RX ORDER — SODIUM CHLORIDE 9 MG/ML
30 INJECTION, SOLUTION INTRAVENOUS CONTINUOUS
Status: DISCONTINUED | OUTPATIENT
Start: 2024-03-26 | End: 2024-03-26 | Stop reason: HOSPADM

## 2024-03-26 RX ORDER — LEVOTHYROXINE SODIUM 75 UG/1
TABLET ORAL
COMMUNITY
Start: 2024-02-13 | End: 2024-03-26 | Stop reason: HOSPADM

## 2024-03-26 RX ORDER — LEVOTHYROXINE SODIUM 75 UG/1
75 TABLET ORAL
Start: 2024-03-26

## 2024-03-26 RX ORDER — PROPOFOL 10 MG/ML
INJECTION, EMULSION INTRAVENOUS AS NEEDED
Status: DISCONTINUED | OUTPATIENT
Start: 2024-03-26 | End: 2024-03-26

## 2024-03-26 RX ADMIN — PROPOFOL 50 MG: 10 INJECTION, EMULSION INTRAVENOUS at 12:19

## 2024-03-26 SDOH — HEALTH STABILITY: MENTAL HEALTH: CURRENT SMOKER: 0

## 2024-03-26 ASSESSMENT — COLUMBIA-SUICIDE SEVERITY RATING SCALE - C-SSRS
2. HAVE YOU ACTUALLY HAD ANY THOUGHTS OF KILLING YOURSELF?: NO
6. HAVE YOU EVER DONE ANYTHING, STARTED TO DO ANYTHING, OR PREPARED TO DO ANYTHING TO END YOUR LIFE?: NO
1. IN THE PAST MONTH, HAVE YOU WISHED YOU WERE DEAD OR WISHED YOU COULD GO TO SLEEP AND NOT WAKE UP?: NO

## 2024-03-26 ASSESSMENT — PAIN SCALES - GENERAL
PAINLEVEL_OUTOF10: 0 - NO PAIN

## 2024-03-26 ASSESSMENT — PAIN - FUNCTIONAL ASSESSMENT
PAIN_FUNCTIONAL_ASSESSMENT: 0-10
PAIN_FUNCTIONAL_ASSESSMENT: 0-10

## 2024-03-26 NOTE — ANESTHESIA PREPROCEDURE EVALUATION
Patient: Reyna Jones    Procedure Information       Anesthesia Start Date/Time: 03/26/24 1215    Procedure: Cardioversion    Location: [facility] / [facility]    Providers: James C Ramicone, DO            Relevant Problems   Anesthesia (within normal limits)      Cardiovascular   (+) Abnormal EKG   (+) Chronic diastolic congestive heart failure (CMS/HCC)   (+) Coronary artery disease   (+) Essential (primary) hypertension   (+) Mixed hyperlipidemia   (+) Moderate mitral regurgitation   (+) Moderate tricuspid regurgitation   (+) Paroxysmal atrial fibrillation (CMS/HCC)      Endocrine   (+) Hypothyroidism   (+) Howard's disease   (+) Toxic nodular goiter   (+) Type 2 diabetes mellitus (CMS/HCC)      GI   (+) GERD (gastroesophageal reflux disease)      Pulmonary   (+) Panlobular emphysema (CMS/HCC)      Hematology   (+) Iron deficiency anemia      Eyes, Ears, Nose, and Throat   (+) Hearing loss      Infectious Disease   (+) Candidiasis of mouth       Clinical information reviewed:   Tobacco   Meds   Med Hx  Surg Hx   Fam Hx  Soc Hx        NPO Detail:  NPO/Void Status  Date of Last Liquid: 03/26/24  Time of Last Liquid: 0900         Physical Exam    Airway  Mallampati: II  TM distance: >3 FB  Neck ROM: full     Cardiovascular   Rhythm: irregular  Rate: abnormal     Dental   (+) upper dentures, lower dentures     Pulmonary    Abdominal        Anesthesia Plan    History of general anesthesia?: yes  History of complications of general anesthesia?: no    ASA 3     MAC     The patient is not a current smoker.  Patient was not previously instructed to abstain from smoking on day of procedure.  Patient did not smoke on day of procedure.    intravenous induction   Anesthetic plan and risks discussed with patient.  Use of blood products discussed with patient who.    Plan discussed with CRNA.

## 2024-03-26 NOTE — ANESTHESIA POSTPROCEDURE EVALUATION
Patient: Reyna Jones    Procedure Summary       Date: 03/26/24 Room / Location: [facility] / [facility]    Anesthesia Start: 1215 Anesthesia Stop: 1227    Procedure: Cardioversion Diagnosis:       Paroxysmal atrial fibrillation (CMS/HCC)      (Paroxysmal atrial fibrillation (CMS/HCC) [I48.0])    Providers: James C Ramicone, DO Responsible Provider: EMMIE Lewis    Anesthesia Type: Not recorded ASA Status: Not recorded            Anesthesia Type: No value filed.    Vitals Value Taken Time   BP 99/53 03/26/24 1227   Temp 36.2 03/26/24 1227   Pulse 78 03/26/24 1227   Resp 14 03/26/24 1227   SpO2 98 % 03/26/24 1216       Anesthesia Post Evaluation    Patient participation: complete - patient participated  Level of consciousness: awake  Pain management: adequate  Airway patency: patent  Cardiovascular status: acceptable  Respiratory status: acceptable  Hydration status: acceptable  Postoperative Nausea and Vomiting: none        No notable events documented.

## 2024-03-26 NOTE — NURSING NOTE
IV removed. Discharge instructions reviewed with patient and daughter. Verbalized understanding. VSS. Discharged via wheelchair by cath lab RN.

## 2024-03-26 NOTE — DISCHARGE INSTRUCTIONS
DISCHARGE INSTRUCTIONS     FOR SUDDEN AND SEVERE CHEST PAIN, SHORTNESS OF BREATH, EXCESSIVE BLEEDING, SIGNS OF STROKE, OR CHANGES IN MENTAL STATUS YOU SHOULD CALL 911 IMMEDIATELY.     - Upon discharge, you should return home and rest for the remainder of the day and evening. You do not have to stay on bed rest but should not be very active.  It is recommended a responsible adult be with you for the first 24 hours after the procedure.    - No driving for 24 hours after procedure. Please arrange for someone to drive you home from the hospital today.     - Do not drive, operate machinery, or use power tools for 24 hours after your procedure.     - Do not make any legal decisions for 24 hours after your procedure.     - Do not drink alcoholic beverages for 24 hours after your procedure.    - You may shower the day after your procedure.      - You may take acetaminophen (Tylenol) as directed for discomfort.  If pain is not relieved with acetaminophen (Tylenol), contact your doctor.    - If you notice or experience any of the following, you should notify your doctor or seek medical attention  Chest pain or discomfort  Change in mental status or weakness in extremities.  Dizziness, light headedness, or feeling faint.  Signs of infection (i.e. shaking chills, temperature > 100 degrees Fahrenheit, warmth, redness).  Changes in urination   Bloody or black stools  Vomiting blood  Severe nose bleeds  Any excessive bleeding    - Please follow up with Dr. Mobley as scheduled or sooner if needed, [phone].

## 2024-03-26 NOTE — H&P
History and Physical   Pre Surgical Review (< 30 days)      History & Physical Reviewed    I have reviewed the History and Physical dated:  3/19/24   History and Physical reviewed and relevant findings noted. Patient examined to review pertinent physical  findings.: No significant changes   Home Medications Reviewed: Patient on uninterrupted oral anticoagulation with Xarelto.   Allergies Reviewed: no changes noted      Home Medications  Current Outpatient Medications   Medication Instructions    albuterol (ProAir HFA) 90 mcg/actuation inhaler 2 puffs, inhalation, Every 6 hours PRN    CALCIUM-MAGNESIUM-ZINC ORAL 1 capsule, oral, Daily    COQ10, UBIQUINOL, ORAL 200 mg, oral, Daily    cyanocobalamin (VITAMIN B-12) 500 mcg, oral, Daily    fluticasone (Flonase) 50 mcg/actuation nasal spray 1 spray, Each Nostril, Daily, Shake gently. Before first use, prime pump. After use, clean tip and replace cap.    furosemide (LASIX) 40 mg, oral, Daily    ipratropium (Atrovent HFA) 17 mcg/actuation inhaler 2 puffs, inhalation, 4 times daily RT    ipratropium (Atrovent) 0.02 % nebulizer solution INHALE ONE VIAL (2.5ML) BY NEBULIZER THREE TIMES DAILY    levalbuterol (Xopenex) 1.25 mg/3 mL nebulizer solution INHALE ONE VIAL (3ML) USING NEBULIZER EVERY EIGHT HOURS    levothyroxine (Synthroid, Levoxyl) 75 mcg tablet     levothyroxine (SYNTHROID, LEVOXYL) 88 mcg, oral, Daily before breakfast    metFORMIN (OSM) (FORTAMET) 500 mg, oral, Daily with evening meal, Do not crush, chew, or split.    metoprolol succinate XL (TOPROL-XL) 50 mg, oral, 2 times daily, Do not crush or chew.    pantoprazole (PROTONIX) 40 mg, oral, Daily    rivaroxaban (XARELTO) 20 mg, oral, Daily with evening meal, Take with food.    rosuvastatin (CRESTOR) 10 mg, oral, Daily    tiotropium Br/olodaterol HCl (STIOLTO RESPIMAT INHL) inhalation        Allergies  Allergies   Allergen Reactions    Propoxyphene N-Acetaminophen Unknown       Physical Exam  Physical  Exam  Constitutional:       General: She is not in acute distress.  Cardiovascular:      Rate and Rhythm: Rhythm irregular.      Pulses: Normal pulses.   Pulmonary:      Effort: Pulmonary effort is normal. No respiratory distress.      Comments: Diminished bilaterally.  Abdominal:      General: Bowel sounds are normal.   Neurological:      General: No focal deficit present.      Mental Status: She is alert and oriented to person, place, and time.   Psychiatric:         Mood and Affect: Mood normal.         Behavior: Behavior normal.          Airway/Sedation Assessment     Assessment by anesthesia See anesthesia airway/sedation assessment     ·  Mouth Opening OK See anesthesia airway/sedation assessment      Neck Flexibility OK See anesthesia airway/sedation assessment      Loose Teeth See anesthesia airway/sedation assessment   ·  Oropharyngeal Classification See anesthesia airway/sedation assessment   ·  ASA PS Classification ASA III - Patient with severe systemic disease       Sedation Plan See anesthesia airway/sedation assessment     Risks, benefits, and alternatives discussed with patient.     ERAS (Enhanced Recovery After Surgery):  ·  ERAS Patient: No      Consent:   COVID-19 Consent:  ·  COVID-19 Risk Consent Surgeon has reviewed key risks related to the risk of venancio COVID-19 and if they contract COVID-19 what the risks are.     Asmita Amos, APRN-CNP

## 2024-04-01 LAB
ATRIAL RATE: 441 BPM
Q ONSET: 219 MS
QRS COUNT: 21 BEATS
QRS DURATION: 72 MS
QT INTERVAL: 330 MS
QTC CALCULATION(BAZETT): 474 MS
QTC FREDERICIA: 420 MS
R AXIS: 47 DEGREES
T AXIS: 88 DEGREES
T OFFSET: 384 MS
VENTRICULAR RATE: 124 BPM

## 2024-04-02 ENCOUNTER — OFFICE VISIT (OUTPATIENT)
Dept: PRIMARY CARE | Facility: CLINIC | Age: 74
End: 2024-04-02
Payer: MEDICARE

## 2024-04-02 VITALS
SYSTOLIC BLOOD PRESSURE: 116 MMHG | HEIGHT: 64 IN | WEIGHT: 142 LBS | RESPIRATION RATE: 16 BRPM | HEART RATE: 69 BPM | OXYGEN SATURATION: 93 % | DIASTOLIC BLOOD PRESSURE: 62 MMHG | BODY MASS INDEX: 24.24 KG/M2 | TEMPERATURE: 97.6 F

## 2024-04-02 DIAGNOSIS — J43.1 PANLOBULAR EMPHYSEMA (MULTI): ICD-10-CM

## 2024-04-02 DIAGNOSIS — E11.9 TYPE 2 DIABETES MELLITUS WITHOUT COMPLICATION, WITHOUT LONG-TERM CURRENT USE OF INSULIN (MULTI): Primary | ICD-10-CM

## 2024-04-02 DIAGNOSIS — K21.9 GASTROESOPHAGEAL REFLUX DISEASE WITHOUT ESOPHAGITIS: ICD-10-CM

## 2024-04-02 DIAGNOSIS — E78.2 MIXED HYPERLIPIDEMIA: ICD-10-CM

## 2024-04-02 DIAGNOSIS — I48.0 PAROXYSMAL ATRIAL FIBRILLATION (MULTI): ICD-10-CM

## 2024-04-02 DIAGNOSIS — I50.32 CHRONIC DIASTOLIC CONGESTIVE HEART FAILURE (MULTI): ICD-10-CM

## 2024-04-02 DIAGNOSIS — I10 ESSENTIAL (PRIMARY) HYPERTENSION: ICD-10-CM

## 2024-04-02 DIAGNOSIS — E03.9 ACQUIRED HYPOTHYROIDISM: ICD-10-CM

## 2024-04-02 PROBLEM — B37.0 CANDIDIASIS OF MOUTH: Status: RESOLVED | Noted: 2024-01-12 | Resolved: 2024-04-02

## 2024-04-02 PROBLEM — R42 DIZZINESS: Status: RESOLVED | Noted: 2024-01-04 | Resolved: 2024-04-02

## 2024-04-02 PROCEDURE — 3044F HG A1C LEVEL LT 7.0%: CPT | Performed by: FAMILY MEDICINE

## 2024-04-02 PROCEDURE — 3074F SYST BP LT 130 MM HG: CPT | Performed by: FAMILY MEDICINE

## 2024-04-02 PROCEDURE — 99214 OFFICE O/P EST MOD 30 MIN: CPT | Performed by: FAMILY MEDICINE

## 2024-04-02 PROCEDURE — 1170F FXNL STATUS ASSESSED: CPT | Performed by: FAMILY MEDICINE

## 2024-04-02 PROCEDURE — 3008F BODY MASS INDEX DOCD: CPT | Performed by: FAMILY MEDICINE

## 2024-04-02 PROCEDURE — 1159F MED LIST DOCD IN RCRD: CPT | Performed by: FAMILY MEDICINE

## 2024-04-02 PROCEDURE — 1160F RVW MEDS BY RX/DR IN RCRD: CPT | Performed by: FAMILY MEDICINE

## 2024-04-02 PROCEDURE — 3078F DIAST BP <80 MM HG: CPT | Performed by: FAMILY MEDICINE

## 2024-04-02 ASSESSMENT — ACTIVITIES OF DAILY LIVING (ADL)
TAKING_MEDICATION: INDEPENDENT
DRESSING: INDEPENDENT
DOING_HOUSEWORK: INDEPENDENT
BATHING: INDEPENDENT
MANAGING_FINANCES: INDEPENDENT
GROCERY_SHOPPING: INDEPENDENT

## 2024-04-02 ASSESSMENT — ENCOUNTER SYMPTOMS
LOSS OF SENSATION IN FEET: 0
DEPRESSION: 0
OCCASIONAL FEELINGS OF UNSTEADINESS: 0

## 2024-04-02 ASSESSMENT — PATIENT HEALTH QUESTIONNAIRE - PHQ9
2. FEELING DOWN, DEPRESSED OR HOPELESS: NOT AT ALL
SUM OF ALL RESPONSES TO PHQ9 QUESTIONS 1 AND 2: 0
1. LITTLE INTEREST OR PLEASURE IN DOING THINGS: NOT AT ALL

## 2024-04-02 NOTE — PROGRESS NOTES
"Subjective   Patient ID: Reyna Jones is a 74 y.o. female who presents for Follow-up. I last saw the patient 1/9/2024. Patient's family is with her today.     HPI   Patient states her thryoid has been bothering her ever since she started using her inhaler. She admits to sore throat pain with yawning, swallowing, etc.     He notes that she feels no different after her A-Fib treatment.     Review of Systems  Except positives as noted in the CC & HPI      Constitutional: Denies fevers, chills, night sweats, fatigue, weight changes, change in appetite    Eyes: Denies blurry vision, double vision    ENT: Denies otalgia, trouble hearing, tinnitus, vertigo, nasal congestion, rhinorrhea, sore throat    Neck: Denies swelling, masses    Cardiovascular: Denies chest pain, palpitations, edema, orthopnea, syncope    Respiratory: Denies dyspnea, cough, wheezing, postural nocturnal dyspnea    Gastrointestinal: Denies abdominal pain, nausea, vomiting, diarrhea, constipation, melena, hematochezia    Genitourinary: Denies dysuria, hematuria, frequency, urgency    Musculoskeletal: Denies back pain, arthralgias, myalgias    Integumentary: Denies skin lesions, rashes, masses    Neurological: Denies dizziness, headaches, confusion, limb weakness, paresthesias, syncope, convulsions    Psychiatric: Denies depression, anxiety, homicidal ideations, suicidal ideations, sleep disturbances    Endocrine: Denies polyphagia, polydipsia, polyuria, weakness, hair thinning, heat intolerance, cold intolerance, weight changes    Heme/Lymph: Denies easy bruising, easy bleeding, swollen glands    Objective   /62 (BP Location: Right arm, Patient Position: Sitting)   Pulse 69   Temp 36.4 °C (97.6 °F)   Resp 16   Ht 1.626 m (5' 4\")   Wt 64.4 kg (142 lb)   SpO2 93%   BMI 24.37 kg/m²     Physical Exam  116/62 on recheck of BP in the right arm.     Gen. Appearance - well-developed, well-nourished, 74 y.o., White female in no acute distress. "     Skin - warm, pink and dry without rash or concerning lesions.     Mental Status - alert and oriented times 3. Normal mood and affect appropriate to mood.     Mouth - pharynx is pink without exudates. Dentition is normal appearing. Tongue and uvula move in the midline.     Neck - supple without lymphadenopathy. Carotid pulses are normal without bruits. Thyroid is normal in midline without nodules.    Chest - lungs are clear to auscultation without rales, rhonchi or wheezes. Diminished breath sounds at the bases bilaterally.     Heart - regular, rate, and rhythm without murmurs, rubs or gallops.     Extremities - no cyanosis, clubbing or edema. Pedal pulses are 2+ normal at the dorsalis pedis and posterior tibial pulses bilaterally.     Neurological - cranial nerves II through XII are grossly intact. Motor strength 5/5 at all fours.     Assessment/Plan   1. Type 2 diabetes mellitus without complication, without long-term current use of insulin (CMS/HCC)        2. Essential (primary) hypertension        3. Acquired hypothyroidism  Follow Up [facility] - PCP - Established      4. Mixed hyperlipidemia  Follow Up [facility] - PCP - Established      5. Chronic diastolic congestive heart failure (CMS/HCC)        6. Paroxysmal atrial fibrillation (CMS/HCC)        7. Gastroesophageal reflux disease without esophagitis        8. Panlobular emphysema (CMS/HCC)  Referral to Pulmonology      9. Body mass index (BMI) of 24.0 to 24.9 in adult        Patient will continue on a diabetic, low-cholesterol diet and weight reduction. Exercise as tolerated. She will continue medications as prescribed. Follow-up in 3 month(s) otherwise as needed.      Refer patient to Dr. Billie Amaral for further evaluation and treatment of COPD.     Patient is to follow up with Dr. Mobley (cardio), Dr. Sanon (endo) as scheduled. Patient will speak with Dr. Sanon about her neck pain after starting the new inhaler.        Scribe Attestation  By signing my name below, I, Dionne Flores, [place]   attest that this documentation has been prepared under the direction and in the presence of Jeffery Ayoub MD.

## 2024-04-02 NOTE — PATIENT INSTRUCTIONS
Patient will continue on a diabetic, low-cholesterol diet and weight reduction. Exercise as tolerated. She will continue medications as prescribed. Follow-up in 3 month(s) otherwise as needed.      Refer patient to Dr. Billie Amaral for further evaluation and treatment of COPD.     Patient is to follow up with Dr. Mobley (cardio), Dr. Sanon (endo) as scheduled.

## 2024-04-03 ENCOUNTER — APPOINTMENT (OUTPATIENT)
Dept: CARDIOLOGY | Facility: CLINIC | Age: 74
End: 2024-04-03
Payer: MEDICARE

## 2024-04-30 ENCOUNTER — OFFICE VISIT (OUTPATIENT)
Dept: CARDIOLOGY | Facility: CLINIC | Age: 74
End: 2024-04-30
Payer: MEDICARE

## 2024-04-30 VITALS
BODY MASS INDEX: 23.22 KG/M2 | HEIGHT: 64 IN | HEART RATE: 72 BPM | WEIGHT: 136 LBS | DIASTOLIC BLOOD PRESSURE: 58 MMHG | OXYGEN SATURATION: 99 % | SYSTOLIC BLOOD PRESSURE: 102 MMHG

## 2024-04-30 DIAGNOSIS — E78.2 MIXED HYPERLIPIDEMIA: ICD-10-CM

## 2024-04-30 DIAGNOSIS — I10 ESSENTIAL (PRIMARY) HYPERTENSION: ICD-10-CM

## 2024-04-30 DIAGNOSIS — I07.1 MODERATE TRICUSPID REGURGITATION: ICD-10-CM

## 2024-04-30 DIAGNOSIS — I50.32 CHRONIC DIASTOLIC CONGESTIVE HEART FAILURE (MULTI): Primary | ICD-10-CM

## 2024-04-30 DIAGNOSIS — I34.0 MODERATE MITRAL REGURGITATION: ICD-10-CM

## 2024-04-30 DIAGNOSIS — I25.10 CORONARY ARTERY DISEASE INVOLVING NATIVE CORONARY ARTERY OF NATIVE HEART WITHOUT ANGINA PECTORIS: ICD-10-CM

## 2024-04-30 DIAGNOSIS — I48.0 PAROXYSMAL ATRIAL FIBRILLATION (MULTI): ICD-10-CM

## 2024-04-30 PROCEDURE — 1160F RVW MEDS BY RX/DR IN RCRD: CPT | Performed by: INTERNAL MEDICINE

## 2024-04-30 PROCEDURE — 1036F TOBACCO NON-USER: CPT | Performed by: INTERNAL MEDICINE

## 2024-04-30 PROCEDURE — 1159F MED LIST DOCD IN RCRD: CPT | Performed by: INTERNAL MEDICINE

## 2024-04-30 PROCEDURE — 3074F SYST BP LT 130 MM HG: CPT | Performed by: INTERNAL MEDICINE

## 2024-04-30 PROCEDURE — 3078F DIAST BP <80 MM HG: CPT | Performed by: INTERNAL MEDICINE

## 2024-04-30 PROCEDURE — 3008F BODY MASS INDEX DOCD: CPT | Performed by: INTERNAL MEDICINE

## 2024-04-30 PROCEDURE — 3044F HG A1C LEVEL LT 7.0%: CPT | Performed by: INTERNAL MEDICINE

## 2024-04-30 PROCEDURE — 99214 OFFICE O/P EST MOD 30 MIN: CPT | Performed by: INTERNAL MEDICINE

## 2024-04-30 ASSESSMENT — ENCOUNTER SYMPTOMS
SHORTNESS OF BREATH: 1
DYSPNEA ON EXERTION: 1

## 2024-04-30 NOTE — PATIENT INSTRUCTIONS
Check your pulse with  your finger pulse oximeter once daily.  Your heart rate should be in the 70's at rest.

## 2024-06-12 ENCOUNTER — LAB (OUTPATIENT)
Dept: LAB | Facility: LAB | Age: 74
End: 2024-06-12
Payer: MEDICARE

## 2024-06-12 DIAGNOSIS — E03.9 ACQUIRED HYPOTHYROIDISM: ICD-10-CM

## 2024-06-12 DIAGNOSIS — E03.9 HYPOTHYROIDISM, UNSPECIFIED: ICD-10-CM

## 2024-06-12 DIAGNOSIS — E53.8 DEFICIENCY OF OTHER SPECIFIED B GROUP VITAMINS: ICD-10-CM

## 2024-06-12 DIAGNOSIS — E11.9 TYPE 2 DIABETES MELLITUS WITHOUT COMPLICATIONS (MULTI): Primary | ICD-10-CM

## 2024-06-12 DIAGNOSIS — E55.9 VITAMIN D DEFICIENCY, UNSPECIFIED: ICD-10-CM

## 2024-06-12 LAB
ALBUMIN SERPL BCP-MCNC: 4.5 G/DL (ref 3.4–5)
ALP SERPL-CCNC: 70 U/L (ref 33–136)
ALT SERPL W P-5'-P-CCNC: 13 U/L (ref 7–45)
ANION GAP SERPL CALC-SCNC: 11 MMOL/L (ref 10–20)
AST SERPL W P-5'-P-CCNC: 27 U/L (ref 9–39)
BASOPHILS # BLD AUTO: 0.04 X10*3/UL (ref 0–0.1)
BASOPHILS NFR BLD AUTO: 0.6 %
BILIRUB SERPL-MCNC: 0.5 MG/DL (ref 0–1.2)
BUN SERPL-MCNC: 19 MG/DL (ref 6–23)
CALCIUM SERPL-MCNC: 9.4 MG/DL (ref 8.6–10.3)
CHLORIDE SERPL-SCNC: 99 MMOL/L (ref 98–107)
CO2 SERPL-SCNC: 34 MMOL/L (ref 21–32)
CREAT SERPL-MCNC: 0.83 MG/DL (ref 0.5–1.05)
CREAT UR-MCNC: 137.3 MG/DL (ref 20–320)
EGFRCR SERPLBLD CKD-EPI 2021: 75 ML/MIN/1.73M*2
EOSINOPHIL # BLD AUTO: 0.51 X10*3/UL (ref 0–0.4)
EOSINOPHIL NFR BLD AUTO: 7.2 %
ERYTHROCYTE [DISTWIDTH] IN BLOOD BY AUTOMATED COUNT: 12.8 % (ref 11.5–14.5)
EST. AVERAGE GLUCOSE BLD GHB EST-MCNC: 137 MG/DL
GLUCOSE SERPL-MCNC: 96 MG/DL (ref 74–99)
HBA1C MFR BLD: 6.4 %
HCT VFR BLD AUTO: 41.8 % (ref 36–46)
HGB BLD-MCNC: 13.4 G/DL (ref 12–16)
IMM GRANULOCYTES # BLD AUTO: 0.01 X10*3/UL (ref 0–0.5)
IMM GRANULOCYTES NFR BLD AUTO: 0.1 % (ref 0–0.9)
LYMPHOCYTES # BLD AUTO: 1.78 X10*3/UL (ref 0.8–3)
LYMPHOCYTES NFR BLD AUTO: 25.1 %
MCH RBC QN AUTO: 30.5 PG (ref 26–34)
MCHC RBC AUTO-ENTMCNC: 32.1 G/DL (ref 32–36)
MCV RBC AUTO: 95 FL (ref 80–100)
MICROALBUMIN UR-MCNC: 83 MG/L
MICROALBUMIN/CREAT UR: 60.5 UG/MG CREAT
MONOCYTES # BLD AUTO: 0.61 X10*3/UL (ref 0.05–0.8)
MONOCYTES NFR BLD AUTO: 8.6 %
NEUTROPHILS # BLD AUTO: 4.13 X10*3/UL (ref 1.6–5.5)
NEUTROPHILS NFR BLD AUTO: 58.4 %
NRBC BLD-RTO: 0 /100 WBCS (ref 0–0)
PLATELET # BLD AUTO: 265 X10*3/UL (ref 150–450)
POTASSIUM SERPL-SCNC: 4.4 MMOL/L (ref 3.5–5.3)
PROT SERPL-MCNC: 7 G/DL (ref 6.4–8.2)
RBC # BLD AUTO: 4.4 X10*6/UL (ref 4–5.2)
SODIUM SERPL-SCNC: 140 MMOL/L (ref 136–145)
T4 FREE SERPL-MCNC: 1.25 NG/DL (ref 0.61–1.12)
TSH SERPL-ACNC: 2.36 MIU/L (ref 0.44–3.98)
VIT B12 SERPL-MCNC: 2846 PG/ML (ref 211–911)
WBC # BLD AUTO: 7.1 X10*3/UL (ref 4.4–11.3)

## 2024-06-12 PROCEDURE — 84443 ASSAY THYROID STIM HORMONE: CPT

## 2024-06-12 PROCEDURE — 85025 COMPLETE CBC W/AUTO DIFF WBC: CPT

## 2024-06-12 PROCEDURE — 82607 VITAMIN B-12: CPT

## 2024-06-12 PROCEDURE — 36415 COLL VENOUS BLD VENIPUNCTURE: CPT

## 2024-06-12 PROCEDURE — 82570 ASSAY OF URINE CREATININE: CPT

## 2024-06-12 PROCEDURE — 83036 HEMOGLOBIN GLYCOSYLATED A1C: CPT

## 2024-06-12 PROCEDURE — 80053 COMPREHEN METABOLIC PANEL: CPT

## 2024-06-12 PROCEDURE — 84439 ASSAY OF FREE THYROXINE: CPT

## 2024-06-12 PROCEDURE — 82043 UR ALBUMIN QUANTITATIVE: CPT

## 2024-06-19 ENCOUNTER — TELEPHONE (OUTPATIENT)
Dept: PRIMARY CARE | Facility: CLINIC | Age: 74
End: 2024-06-19
Payer: MEDICARE

## 2024-06-19 DIAGNOSIS — E03.9 ACQUIRED HYPOTHYROIDISM: ICD-10-CM

## 2024-06-19 DIAGNOSIS — R79.89 ABNORMAL TSH: ICD-10-CM

## 2024-06-19 NOTE — TELEPHONE ENCOUNTER
Patient called in stating she was given the results from her recent labs. She stated she is worried about her thyroxine level coming back at 1.25. she is wondering what could have caused this? Patient also wondering if there is anything she can do to lower this level?  Please Advise

## 2024-06-20 NOTE — TELEPHONE ENCOUNTER
Pt was called and made aware that BB is not concerned that the T4 was slightly elevated and ill return to normal with time. I informed her to continue her current does of levothyroxine and that we will put labs in for TSH with free T4 and T3, Free in to retest in 3 months.    Labs have been ordered.

## 2024-07-02 ENCOUNTER — TELEPHONE (OUTPATIENT)
Dept: PRIMARY CARE | Facility: CLINIC | Age: 74
End: 2024-07-02

## 2024-07-02 ENCOUNTER — APPOINTMENT (OUTPATIENT)
Dept: PRIMARY CARE | Facility: CLINIC | Age: 74
End: 2024-07-02
Payer: MEDICARE

## 2024-07-02 DIAGNOSIS — J43.1 PANLOBULAR EMPHYSEMA (MULTI): Primary | ICD-10-CM

## 2024-07-02 RX ORDER — TIOTROPIUM BROMIDE AND OLODATEROL 3.124; 2.736 UG/1; UG/1
2 SPRAY, METERED RESPIRATORY (INHALATION) DAILY
Qty: 4 G | Refills: 1 | Status: SHIPPED | OUTPATIENT
Start: 2024-07-02 | End: 2024-08-31

## 2024-07-02 NOTE — TELEPHONE ENCOUNTER
Patient came in for an appt. Today had an appt for 9:15 was late had to rescheduled.  Reyna wanted to know if she can get this medication Stiolto Respimat 2.5 mcg/2.5mcg  inhale 2 puffs daily as directed it was prescribed by Dr Schultz . She doesn't see the new pulmonology until October and needs this medication until she sees this doctor.

## 2024-07-09 ENCOUNTER — APPOINTMENT (OUTPATIENT)
Dept: PULMONOLOGY | Facility: CLINIC | Age: 74
End: 2024-07-09
Payer: MEDICARE

## 2024-08-26 ENCOUNTER — APPOINTMENT (OUTPATIENT)
Dept: PRIMARY CARE | Facility: CLINIC | Age: 74
End: 2024-08-26
Payer: MEDICARE

## 2024-08-26 VITALS
WEIGHT: 139 LBS | HEIGHT: 64 IN | TEMPERATURE: 97.4 F | BODY MASS INDEX: 23.73 KG/M2 | DIASTOLIC BLOOD PRESSURE: 80 MMHG | HEART RATE: 70 BPM | RESPIRATION RATE: 16 BRPM | SYSTOLIC BLOOD PRESSURE: 118 MMHG

## 2024-08-26 ASSESSMENT — PATIENT HEALTH QUESTIONNAIRE - PHQ9
SUM OF ALL RESPONSES TO PHQ9 QUESTIONS 1 AND 2: 0
2. FEELING DOWN, DEPRESSED OR HOPELESS: NOT AT ALL
1. LITTLE INTEREST OR PLEASURE IN DOING THINGS: NOT AT ALL

## 2024-08-26 NOTE — PROGRESS NOTES
Review acp!!!  Has active labs        Subjective   Patient ID: Reyna Jones is a 74 y.o. female who presents for Follow-up (Mixed hyperlipidemia; Acquired hypothyroidism). I last saw the patient/ 4/2/2024      HPI     Patient is here for a F/U    Pt is not fasting for labs    Past medical, surgical, and family history reviewed.  Reviewed and documented all medications   Pt eating well, exercising as tolerated and taking medications as directed    Has no medical concerns for rooming MA      Review of Systems  Except positives as noted in the CC & HPI      Constitutional: Denies fevers, chills, night sweats, fatigue, weight changes, change in appetite    Eyes: Denies blurry vision, double vision    ENT: Denies otalgia, trouble hearing, tinnitus, vertigo, nasal congestion, rhinorrhea, sore throat    Neck: Denies swelling, masses    Cardiovascular: Denies chest pain, palpitations, edema, orthopnea, syncope    Respiratory: Denies dyspnea, cough, wheezing, postural nocturnal dyspnea    Gastrointestinal: Denies abdominal pain, nausea, vomiting, diarrhea, constipation, melena, hematochezia    Genitourinary: Denies dysuria, hematuria, frequency, urgency    Musculoskeletal: Denies back pain, arthralgias, myalgias    Integumentary: Denies skin lesions, rashes, masses    Neurological: Denies dizziness, headaches, confusion, limb weakness, paresthesias, syncope, convulsions    Psychiatric: Denies depression, anxiety, homicidal ideations, suicidal ideations, sleep disturbances    Endocrine: Denies polyphagia, polydipsia, polyuria, weakness, hair thinning, heat intolerance, cold intolerance, weight changes    Heme/Lymph: Denies easy bruising, easy bleeding, swollen glands    Objective   There were no vitals taken for this visit.    Physical Exam  116/62 on recheck of BP in the right arm.     Gen. Appearance - well-developed, well-nourished, 74 y.o., White female in no acute distress.     Skin - warm, pink and dry  without rash or concerning lesions.     Mental Status - alert and oriented times 3. Normal mood and affect appropriate to mood.     Mouth - pharynx is pink without exudates. Dentition is normal appearing. Tongue and uvula move in the midline.     Neck - supple without lymphadenopathy. Carotid pulses are normal without bruits. Thyroid is normal in midline without nodules.    Chest - lungs are clear to auscultation without rales, rhonchi or wheezes. Diminished breath sounds at the bases bilaterally.     Heart - regular, rate, and rhythm without murmurs, rubs or gallops.     Extremities - no cyanosis, clubbing or edema. Pedal pulses are 2+ normal at the dorsalis pedis and posterior tibial pulses bilaterally.     Neurological - cranial nerves II through XII are grossly intact. Motor strength 5/5 at all fours.     Assessment/Plan   No diagnosis found.  Patient will continue on a diabetic, low-cholesterol diet and weight reduction. Exercise as tolerated. She will continue medications as prescribed. Follow-up in 3 month(s) otherwise as needed.      Refer patient to Dr. Billie Amaral for further evaluation and treatment of COPD.     Patient is to follow up with Dr. Mobley (cardio), Dr. Sanon (endo) as scheduled. Patient will speak with Dr. Sanon about her neck pain after starting the new inhaler.       Scribe Attestation  By signing my name below, Dionne HENAO, Brock   attest that this documentation has been prepared under the direction and in the presence of Jeffery Ayoub MD.

## 2024-09-12 ENCOUNTER — APPOINTMENT (OUTPATIENT)
Dept: PRIMARY CARE | Facility: CLINIC | Age: 74
End: 2024-09-12
Payer: MEDICARE

## 2024-09-12 VITALS
DIASTOLIC BLOOD PRESSURE: 50 MMHG | HEART RATE: 62 BPM | RESPIRATION RATE: 16 BRPM | SYSTOLIC BLOOD PRESSURE: 88 MMHG | WEIGHT: 138 LBS | HEIGHT: 64 IN | BODY MASS INDEX: 23.56 KG/M2 | OXYGEN SATURATION: 96 % | TEMPERATURE: 97.2 F

## 2024-09-12 DIAGNOSIS — I48.0 PAROXYSMAL ATRIAL FIBRILLATION (MULTI): ICD-10-CM

## 2024-09-12 DIAGNOSIS — E03.9 ACQUIRED HYPOTHYROIDISM: ICD-10-CM

## 2024-09-12 DIAGNOSIS — E78.2 MIXED HYPERLIPIDEMIA: ICD-10-CM

## 2024-09-12 DIAGNOSIS — I10 ESSENTIAL (PRIMARY) HYPERTENSION: ICD-10-CM

## 2024-09-12 DIAGNOSIS — I25.10 CORONARY ARTERY DISEASE INVOLVING NATIVE CORONARY ARTERY OF NATIVE HEART WITHOUT ANGINA PECTORIS: ICD-10-CM

## 2024-09-12 DIAGNOSIS — Z23 FLU VACCINE NEED: ICD-10-CM

## 2024-09-12 DIAGNOSIS — J43.1 PANLOBULAR EMPHYSEMA (MULTI): ICD-10-CM

## 2024-09-12 DIAGNOSIS — E11.9 TYPE 2 DIABETES MELLITUS WITHOUT COMPLICATION, WITHOUT LONG-TERM CURRENT USE OF INSULIN (MULTI): Primary | ICD-10-CM

## 2024-09-12 PROCEDURE — 3074F SYST BP LT 130 MM HG: CPT | Performed by: FAMILY MEDICINE

## 2024-09-12 PROCEDURE — 99214 OFFICE O/P EST MOD 30 MIN: CPT | Performed by: FAMILY MEDICINE

## 2024-09-12 PROCEDURE — 1123F ACP DISCUSS/DSCN MKR DOCD: CPT | Performed by: FAMILY MEDICINE

## 2024-09-12 PROCEDURE — 1036F TOBACCO NON-USER: CPT | Performed by: FAMILY MEDICINE

## 2024-09-12 PROCEDURE — 3060F POS MICROALBUMINURIA REV: CPT | Performed by: FAMILY MEDICINE

## 2024-09-12 PROCEDURE — G0008 ADMIN INFLUENZA VIRUS VAC: HCPCS | Performed by: FAMILY MEDICINE

## 2024-09-12 PROCEDURE — 3078F DIAST BP <80 MM HG: CPT | Performed by: FAMILY MEDICINE

## 2024-09-12 PROCEDURE — 90662 IIV NO PRSV INCREASED AG IM: CPT | Performed by: FAMILY MEDICINE

## 2024-09-12 PROCEDURE — 1160F RVW MEDS BY RX/DR IN RCRD: CPT | Performed by: FAMILY MEDICINE

## 2024-09-12 PROCEDURE — 3044F HG A1C LEVEL LT 7.0%: CPT | Performed by: FAMILY MEDICINE

## 2024-09-12 PROCEDURE — 3008F BODY MASS INDEX DOCD: CPT | Performed by: FAMILY MEDICINE

## 2024-09-12 PROCEDURE — 1159F MED LIST DOCD IN RCRD: CPT | Performed by: FAMILY MEDICINE

## 2024-09-12 ASSESSMENT — PATIENT HEALTH QUESTIONNAIRE - PHQ9
2. FEELING DOWN, DEPRESSED OR HOPELESS: NOT AT ALL
1. LITTLE INTEREST OR PLEASURE IN DOING THINGS: NOT AT ALL
SUM OF ALL RESPONSES TO PHQ9 QUESTIONS 1 AND 2: 0

## 2024-09-12 NOTE — PROGRESS NOTES
Subjective   Patient ID: Reyna Jones is a 74 y.o. female who presents for Follow-up (Mixed hyperlipidemia; Acquired hypothyroidism/). I last saw the patient 4/2/2024    HPI     Patient is here for a F/U    Pt does want flu shot today, rooming MA administered    Past medical, surgical, and family history reviewed.  Reviewed and documented all medications   Pt eating well, exercising as tolerated and taking medications as directed    Has no medical concerns for rooming MA.  Patient denies any lightheadedness or dizziness except when she stands up too quickly.  She does keep up with her fluid intake, generally, with water and juices.  She does drink a glass of milk as well.  Patient does have follow-up appointments with Dr. Mobley, cardiology and Dr. Billie Amaral, her new pulmonologist.  She does continue to see her endocrinologist, Dr. Sanon, for management of her diabetes and hypothyroidism.      Review of Systems  Except positives as noted in the CC & HPI      Constitutional: Denies fevers, chills, night sweats, fatigue, weight changes, change in appetite    Eyes: Denies blurry vision, double vision    ENT: Denies otalgia, trouble hearing, tinnitus, vertigo, nasal congestion, rhinorrhea, sore throat    Neck: Denies swelling, masses    Cardiovascular: Denies chest pain, palpitations, edema, orthopnea, syncope    Respiratory: Denies dyspnea, cough, wheezing, postural nocturnal dyspnea    Gastrointestinal: Denies abdominal pain, nausea, vomiting, diarrhea, constipation, melena, hematochezia    Genitourinary: Denies dysuria, hematuria, frequency, urgency    Musculoskeletal: Denies back pain, arthralgias, myalgias    Integumentary: Denies skin lesions, rashes, masses    Neurological: Denies dizziness, headaches, confusion, limb weakness, paresthesias, syncope, convulsions    Psychiatric: Denies depression, anxiety, homicidal ideations, suicidal ideations, sleep disturbances    Endocrine: Denies polyphagia,  "polydipsia, polyuria, weakness, hair thinning, heat intolerance, cold intolerance, weight changes    Heme/Lymph: Denies easy bruising, easy bleeding, swollen glands    Objective   BP 88/50 (BP Location: Right arm)   Pulse 62   Temp 36.2 °C (97.2 °F)   Resp 16   Ht 1.626 m (5' 4\")   Wt 62.6 kg (138 lb)   SpO2 96%   BMI 23.69 kg/m²     Physical Exam    Gen. Appearance - well-developed, well-nourished, 74 y.o., White female in no acute distress.     Skin - warm, pink and dry without rash or concerning lesions.     Mental Status - alert and oriented times 3. Normal mood and affect appropriate to mood.     Mouth - pharynx is pink without exudates. Dentition is normal appearing. Tongue and uvula move in the midline.     Neck - supple without lymphadenopathy. Carotid pulses are normal without bruits. Thyroid is normal in midline without nodules.    Chest - lungs are clear to auscultation without rales, rhonchi or wheezes. Diminished breath sounds at the bases bilaterally.     Heart - regular, rate, and rhythm without murmurs, rubs or gallops.     Abdomen -soft, flat, nondistended, nontender.  No masses or organomegaly appreciated.  Bowel sounds are normoactive.  No rebound, rigidity or guarding is noted.    Extremities - no cyanosis, clubbing or edema. Pedal pulses are 2+ normal at the dorsalis pedis and posterior tibial pulses bilaterally.     Neurological - cranial nerves II through XII are grossly intact. Motor strength 5/5 at all fours.     Assessment/Plan   1. Type 2 diabetes mellitus without complication, without long-term current use of insulin (Multi)        2. Mixed hyperlipidemia  Lipid panel      3. Essential (primary) hypertension        4. Coronary artery disease involving native coronary artery of native heart without angina pectoris        5. Paroxysmal atrial fibrillation (Multi)        6. Acquired hypothyroidism        7. Panlobular emphysema (Multi)        8. Flu vaccine need  Flu vaccine, " trivalent, preservative free, HIGH-DOSE, age 65y+ (Fluzone)      Patient will continue on a diabetic, low-cholesterol diet and weight reduction. Exercise as tolerated. She will continue medications as prescribed. Follow-up in 6 month(s) otherwise as needed.      Refer patient to Dr. Billie Amaral for further evaluation and treatment of COPD.  Her appointment is scheduled for October 14.    Patient is to follow up with Dr. Mobley (cardio), Dr. Sanon (endo) as scheduled.    Patient was instructed to drink 48 ounces of water per day.    Patient was given a Fluzone injection today.    She will check with her pharmacy regarding a second Shingrix vaccine.

## 2024-09-17 ENCOUNTER — APPOINTMENT (OUTPATIENT)
Dept: PULMONOLOGY | Facility: CLINIC | Age: 74
End: 2024-09-17
Payer: MEDICARE

## 2024-09-18 ENCOUNTER — LAB (OUTPATIENT)
Dept: LAB | Facility: LAB | Age: 74
End: 2024-09-18
Payer: MEDICARE

## 2024-09-18 DIAGNOSIS — E78.5 HYPERLIPIDEMIA, UNSPECIFIED: Primary | ICD-10-CM

## 2024-09-18 DIAGNOSIS — R79.89 ABNORMAL TSH: ICD-10-CM

## 2024-09-18 DIAGNOSIS — E03.9 ACQUIRED HYPOTHYROIDISM: ICD-10-CM

## 2024-09-18 DIAGNOSIS — E11.9 TYPE 2 DIABETES MELLITUS WITHOUT COMPLICATIONS (MULTI): ICD-10-CM

## 2024-09-18 DIAGNOSIS — E78.2 MIXED HYPERLIPIDEMIA: ICD-10-CM

## 2024-09-18 LAB
ALBUMIN SERPL BCP-MCNC: 4.6 G/DL (ref 3.4–5)
ALP SERPL-CCNC: 69 U/L (ref 33–136)
ALT SERPL W P-5'-P-CCNC: 10 U/L (ref 7–45)
ANION GAP SERPL CALC-SCNC: 12 MMOL/L (ref 10–20)
AST SERPL W P-5'-P-CCNC: 22 U/L (ref 9–39)
BILIRUB SERPL-MCNC: 0.5 MG/DL (ref 0–1.2)
BUN SERPL-MCNC: 24 MG/DL (ref 6–23)
CALCIUM SERPL-MCNC: 9.4 MG/DL (ref 8.6–10.3)
CHLORIDE SERPL-SCNC: 97 MMOL/L (ref 98–107)
CHOLEST SERPL-MCNC: 174 MG/DL (ref 0–199)
CHOLESTEROL/HDL RATIO: 3.6
CO2 SERPL-SCNC: 34 MMOL/L (ref 21–32)
CREAT SERPL-MCNC: 1.04 MG/DL (ref 0.5–1.05)
EGFRCR SERPLBLD CKD-EPI 2021: 57 ML/MIN/1.73M*2
EST. AVERAGE GLUCOSE BLD GHB EST-MCNC: 143 MG/DL
GLUCOSE SERPL-MCNC: 108 MG/DL (ref 74–99)
HBA1C MFR BLD: 6.6 %
HDLC SERPL-MCNC: 48.5 MG/DL
LDLC SERPL CALC-MCNC: 90 MG/DL
NON HDL CHOLESTEROL: 126 MG/DL (ref 0–149)
POTASSIUM SERPL-SCNC: 4 MMOL/L (ref 3.5–5.3)
PROT SERPL-MCNC: 7.4 G/DL (ref 6.4–8.2)
SODIUM SERPL-SCNC: 139 MMOL/L (ref 136–145)
T3FREE SERPL-MCNC: 3.1 PG/ML (ref 2.3–4.2)
TRIGL SERPL-MCNC: 177 MG/DL (ref 0–149)
TSH SERPL-ACNC: 1.93 MIU/L (ref 0.44–3.98)
VLDL: 35 MG/DL (ref 0–40)

## 2024-09-18 PROCEDURE — 84443 ASSAY THYROID STIM HORMONE: CPT

## 2024-09-18 PROCEDURE — 84481 FREE ASSAY (FT-3): CPT

## 2024-09-18 PROCEDURE — 80061 LIPID PANEL: CPT

## 2024-09-18 PROCEDURE — 83036 HEMOGLOBIN GLYCOSYLATED A1C: CPT

## 2024-09-18 PROCEDURE — 80053 COMPREHEN METABOLIC PANEL: CPT

## 2024-09-18 PROCEDURE — 36415 COLL VENOUS BLD VENIPUNCTURE: CPT

## 2024-09-21 NOTE — RESULT ENCOUNTER NOTE
Please call the patient regarding her abnormal result.  T.Chol 174, LDL 90, HDL 48, . Follow low cholesterol, low fat diet and exercise as tolerated.  Thyroid function is normal with a TSH of 1.93 and free T3 of 3.1.  Hemoglobin A1c is in good range at 6.6 and BS of 108.  Patient is to continue diabetic diet.    Renal function is mildly decreased with a GFR of 57. Patient is to drink 48-64 oz. Of water per day.

## 2024-10-07 ENCOUNTER — APPOINTMENT (OUTPATIENT)
Dept: CARDIOLOGY | Facility: CLINIC | Age: 74
End: 2024-10-07
Payer: MEDICARE

## 2024-10-07 VITALS
HEART RATE: 77 BPM | BODY MASS INDEX: 23.22 KG/M2 | WEIGHT: 136 LBS | SYSTOLIC BLOOD PRESSURE: 100 MMHG | OXYGEN SATURATION: 90 % | DIASTOLIC BLOOD PRESSURE: 70 MMHG | HEIGHT: 64 IN

## 2024-10-07 DIAGNOSIS — I48.0 PAROXYSMAL ATRIAL FIBRILLATION (MULTI): ICD-10-CM

## 2024-10-07 DIAGNOSIS — R94.31 ABNORMAL EKG: ICD-10-CM

## 2024-10-07 DIAGNOSIS — I07.1 MODERATE TRICUSPID REGURGITATION: ICD-10-CM

## 2024-10-07 DIAGNOSIS — K21.9 GASTROESOPHAGEAL REFLUX DISEASE WITHOUT ESOPHAGITIS: Primary | ICD-10-CM

## 2024-10-07 DIAGNOSIS — I50.32 CHRONIC DIASTOLIC CONGESTIVE HEART FAILURE: ICD-10-CM

## 2024-10-07 DIAGNOSIS — I34.0 MODERATE MITRAL REGURGITATION: ICD-10-CM

## 2024-10-07 DIAGNOSIS — E78.2 MIXED HYPERLIPIDEMIA: ICD-10-CM

## 2024-10-07 DIAGNOSIS — I25.10 CORONARY ARTERY DISEASE INVOLVING NATIVE CORONARY ARTERY OF NATIVE HEART WITHOUT ANGINA PECTORIS: ICD-10-CM

## 2024-10-07 DIAGNOSIS — I10 ESSENTIAL (PRIMARY) HYPERTENSION: ICD-10-CM

## 2024-10-07 PROCEDURE — 3008F BODY MASS INDEX DOCD: CPT | Performed by: INTERNAL MEDICINE

## 2024-10-07 PROCEDURE — 99214 OFFICE O/P EST MOD 30 MIN: CPT | Performed by: INTERNAL MEDICINE

## 2024-10-07 PROCEDURE — 1159F MED LIST DOCD IN RCRD: CPT | Performed by: INTERNAL MEDICINE

## 2024-10-07 PROCEDURE — 3060F POS MICROALBUMINURIA REV: CPT | Performed by: INTERNAL MEDICINE

## 2024-10-07 PROCEDURE — 3044F HG A1C LEVEL LT 7.0%: CPT | Performed by: INTERNAL MEDICINE

## 2024-10-07 PROCEDURE — 1123F ACP DISCUSS/DSCN MKR DOCD: CPT | Performed by: INTERNAL MEDICINE

## 2024-10-07 PROCEDURE — 3074F SYST BP LT 130 MM HG: CPT | Performed by: INTERNAL MEDICINE

## 2024-10-07 PROCEDURE — 1036F TOBACCO NON-USER: CPT | Performed by: INTERNAL MEDICINE

## 2024-10-07 PROCEDURE — 3078F DIAST BP <80 MM HG: CPT | Performed by: INTERNAL MEDICINE

## 2024-10-07 PROCEDURE — 3048F LDL-C <100 MG/DL: CPT | Performed by: INTERNAL MEDICINE

## 2024-10-07 RX ORDER — METOPROLOL SUCCINATE 50 MG/1
50 TABLET, EXTENDED RELEASE ORAL 2 TIMES DAILY
Qty: 180 TABLET | Refills: 3 | Status: SHIPPED | OUTPATIENT
Start: 2024-10-07 | End: 2025-10-07

## 2024-10-07 RX ORDER — FUROSEMIDE 20 MG/1
20 TABLET ORAL DAILY
Qty: 90 TABLET | Refills: 3 | Status: SHIPPED | OUTPATIENT
Start: 2024-10-07 | End: 2025-10-07

## 2024-10-07 RX ORDER — PANTOPRAZOLE SODIUM 40 MG/1
40 TABLET, DELAYED RELEASE ORAL DAILY
Qty: 90 TABLET | Refills: 3 | Status: SHIPPED | OUTPATIENT
Start: 2024-10-07

## 2024-10-07 NOTE — PATIENT INSTRUCTIONS
Increase the rosuvastatin to 20 mg three days per week    Reduce the furosemide (lasix) to 20 mg daily

## 2024-10-07 NOTE — PROGRESS NOTES
Subjective   Reyna Jones is a 74 y.o. female.    Chief Complaint:  Follow-up coronary artery disease, paroxysmal atrial fibrillation.    HPI    Since her last visit she has done well.  Denies palpitations.  Continues to have problems provide fatigue.  Mild exertional dyspnea.  No chest discomfort.  No palpitations.  She is on Xarelto but is having difficulty affording the medication.    She presented on December 28, 2023 to [facility] with shortness of breath and palpitations.  She was having gradual onset of increasing shortness of breath.  She was noted to be hypoxic in the emergency room.  She was admitted to the hospital and treated with IV diuretics.  Her dose of metoprolol was increased.  She was continued on Xarelto.  Since discharge she has felt better.  Shortness of breath is significantly improved.  Denies palpitations.  Gets fatigued very easily.     In 2022 she presented with atrial flutter with a rapid ventricular response. She underwent cardioversion at 50 J which was successful.     Her diagnoses are coronary artery disease is based on the finding of a positive calcium score of 848. This was done in 2019.     She presented on July 10, 2019 with gastrointestinal bleeding. She presented with a hemoglobin of 6.0. She was treated with transfusions. She is noted to be in sinus rhythm. She had upper and lower endoscopy which did not reveal a bleeding source.     An echocardiographic study performed on July 11, 2019 demonstrated normal left ventricular systolic function. Left atrial size was essentially normal. There was significant valvular disease with moderate mitral regurgitation and moderate tricuspid regurgitation.     The patient presented to [facility] on December 20, 2018 with shortness of breath and dyspnea. She is noted to be in atrial fibrillation with a rapid ventricular response.      Past medical history significant for hypothyroidism and a  gastroesophageal reflux condition.      Allergies  Medication    · Darvocet-N 100 TABS   Recorded By: Fadumo Parrish; 9/8/2014 10:06:22 AM     Family History  Mother    · Family history of cardiac disorder (V17.49) (Z82.49)  Father    · Family history of malignant neoplasm (V16.9) (Z80.9)     Social History  Problems    · Born in [place]   · Caffeine use (V49.89) (Z78.9)   ·    · Exercises regularly   · Former smoker (V15.82) (Z87.891)        Review of Systems   Constitutional: Positive for malaise/fatigue.   Cardiovascular:  Positive for dyspnea on exertion.   Respiratory:  Positive for shortness of breath.    Musculoskeletal:  Positive for arthritis.   All other systems reviewed and are negative.    Current Outpatient Medications   Medication Sig Dispense Refill    albuterol (ProAir HFA) 90 mcg/actuation inhaler Inhale 2 puffs every 6 hours if needed for wheezing.      CALCIUM-MAGNESIUM-ZINC ORAL Take 1 capsule by mouth once daily.      COQ10, UBIQUINOL, ORAL Take 200 mg by mouth once daily.      cyanocobalamin (Vitamin B-12) 500 mcg tablet Take 1 tablet (500 mcg) by mouth once daily.      levothyroxine (Synthroid, Levoxyl) 75 mcg tablet Take 1 tablet (75 mcg) by mouth once daily in the morning. Take before meals.      metFORMIN, OSM, (Fortamet) 500 mg 24 hr tablet Take 1 tablet (500 mg) by mouth once daily in the evening. Take with meals. Do not crush, chew, or split.      rosuvastatin (Crestor) 10 mg tablet Take 1 tablet (10 mg) by mouth once daily.      furosemide (Lasix) 20 mg tablet Take 1 tablet (20 mg) by mouth once daily. 90 tablet 3    metoprolol succinate XL (Toprol-XL) 50 mg 24 hr tablet Take 1 tablet (50 mg) by mouth 2 times a day. Do not crush or chew. 180 tablet 3    pantoprazole (ProtoNix) 40 mg EC tablet Take 1 tablet (40 mg) by mouth once daily. 90 tablet 3    rivaroxaban (Xarelto) 20 mg tablet Take 1 tablet (20 mg) by mouth once daily in the evening. Take with meals. Take with food. 90  "tablet 3    tiotropium-olodateroL (Stiolto Respimat) 2.5-2.5 mcg/actuation mist inhaler Inhale 2 Inhalations once daily. 4 g 1     No current facility-administered medications for this visit.        Visit Vitals  /70 (BP Location: Left arm)   Pulse 77   Ht 1.626 m (5' 4\")   Wt 61.7 kg (136 lb)   SpO2 90%   BMI 23.34 kg/m²   OB Status Postmenopausal   Smoking Status Former   BSA 1.67 m²        Objective     Constitutional:       Appearance: Not in distress.   Neck:      Vascular: JVD normal.   Pulmonary:      Breath sounds: Normal breath sounds.   Cardiovascular:      Normal rate. Regular rhythm. Normal S1. Normal S2.       Murmurs: There is no murmur.      No gallop.    Pulses:     Intact distal pulses.   Edema:     Peripheral edema absent.   Abdominal:      General: There is no distension.      Palpations: Abdomen is soft.   Neurological:      Mental Status: Alert.         Lab Review:   Lab Results   Component Value Date     09/18/2024    K 4.0 09/18/2024    CL 97 (L) 09/18/2024    CO2 34 (H) 09/18/2024    BUN 24 (H) 09/18/2024    CREATININE 1.04 09/18/2024    GLUCOSE 108 (H) 09/18/2024    CALCIUM 9.4 09/18/2024     Lab Results   Component Value Date    CHOL 174 09/18/2024    TRIG 177 (H) 09/18/2024    HDL 48.5 09/18/2024    LDLDIRECT 73 09/10/2021       Assessment:    1.  Coronary artery disease.  Extensive coronary disease on the basis of prior testing.  This will limit our options in the future in terms of antiarrhythmic medications.  No anginal symptoms.    2.  Paroxysmal atrial fibrillation.  In sinus rhythm on today's examination.  Discussed some options for coverage for Xarelto.    3.  Hyperlipidemia.  Latest LDL is 90.  Goal LDL would be 70 given the extensive nature of her coronary disease.  Will have her try it rosuvastatin 20 mg 3 days/week.    4.  Valvular heart disease.  Mild aortic stenosis and moderate mitral regurgitation.  Will do echo in the future for follow-up.    5.  Hypotension.  " Notices low blood pressures at home in the range of 90/60.  Today's blood pressure in the office is 100/70.  Will have her decrease furosemide to 20 mg daily.

## 2024-10-14 ENCOUNTER — APPOINTMENT (OUTPATIENT)
Dept: PULMONOLOGY | Facility: CLINIC | Age: 74
End: 2024-10-14
Payer: MEDICARE

## 2024-10-14 VITALS
BODY MASS INDEX: 23.73 KG/M2 | TEMPERATURE: 98.4 F | OXYGEN SATURATION: 97 % | HEIGHT: 64 IN | HEART RATE: 69 BPM | DIASTOLIC BLOOD PRESSURE: 59 MMHG | WEIGHT: 139 LBS | SYSTOLIC BLOOD PRESSURE: 94 MMHG

## 2024-10-14 DIAGNOSIS — F17.211 CIGARETTE NICOTINE DEPENDENCE IN REMISSION: ICD-10-CM

## 2024-10-14 DIAGNOSIS — J41.8 MIXED SIMPLE AND MUCOPURULENT CHRONIC BRONCHITIS (MULTI): Primary | ICD-10-CM

## 2024-10-14 DIAGNOSIS — J43.1 PANLOBULAR EMPHYSEMA (MULTI): ICD-10-CM

## 2024-10-14 PROCEDURE — 3060F POS MICROALBUMINURIA REV: CPT | Performed by: INTERNAL MEDICINE

## 2024-10-14 PROCEDURE — 1126F AMNT PAIN NOTED NONE PRSNT: CPT | Performed by: INTERNAL MEDICINE

## 2024-10-14 PROCEDURE — 3074F SYST BP LT 130 MM HG: CPT | Performed by: INTERNAL MEDICINE

## 2024-10-14 PROCEDURE — 3048F LDL-C <100 MG/DL: CPT | Performed by: INTERNAL MEDICINE

## 2024-10-14 PROCEDURE — 1159F MED LIST DOCD IN RCRD: CPT | Performed by: INTERNAL MEDICINE

## 2024-10-14 PROCEDURE — 3078F DIAST BP <80 MM HG: CPT | Performed by: INTERNAL MEDICINE

## 2024-10-14 PROCEDURE — 3008F BODY MASS INDEX DOCD: CPT | Performed by: INTERNAL MEDICINE

## 2024-10-14 PROCEDURE — 1123F ACP DISCUSS/DSCN MKR DOCD: CPT | Performed by: INTERNAL MEDICINE

## 2024-10-14 PROCEDURE — 3044F HG A1C LEVEL LT 7.0%: CPT | Performed by: INTERNAL MEDICINE

## 2024-10-14 PROCEDURE — 99204 OFFICE O/P NEW MOD 45 MIN: CPT | Performed by: INTERNAL MEDICINE

## 2024-10-14 RX ORDER — TIOTROPIUM BROMIDE AND OLODATEROL 3.124; 2.736 UG/1; UG/1
2 SPRAY, METERED RESPIRATORY (INHALATION) DAILY
Qty: 4 G | Refills: 11 | Status: SHIPPED | OUTPATIENT
Start: 2024-10-14 | End: 2024-10-14 | Stop reason: SDUPTHER

## 2024-10-14 RX ORDER — TIOTROPIUM BROMIDE AND OLODATEROL 3.124; 2.736 UG/1; UG/1
2 SPRAY, METERED RESPIRATORY (INHALATION) DAILY
Qty: 4 G | Refills: 11 | Status: SHIPPED | OUTPATIENT
Start: 2024-10-14 | End: 2025-10-09

## 2024-10-14 ASSESSMENT — COPD QUESTIONNAIRES
QUESTION5_HOMEACTIVITIES: 1
CAT_TOTALSCORE: 15
QUESTION4_WALKINCLINE: 3
QUESTION6_LEAVINGHOUSE: 1
QUESTION1_COUGHFREQUENCY: 3
QUESTION8_ENERGYLEVEL: 4
QUESTION2_CHESTPHLEGM: 1
QUESTION7_SLEEPQUALITY: 1
QUESTION3_CHESTTIGHTNESS: 1

## 2024-10-14 ASSESSMENT — PATIENT HEALTH QUESTIONNAIRE - PHQ9
SUM OF ALL RESPONSES TO PHQ9 QUESTIONS 1 AND 2: 0
1. LITTLE INTEREST OR PLEASURE IN DOING THINGS: NOT AT ALL
2. FEELING DOWN, DEPRESSED OR HOPELESS: NOT AT ALL

## 2024-10-14 ASSESSMENT — ENCOUNTER SYMPTOMS
OCCASIONAL FEELINGS OF UNSTEADINESS: 0
DEPRESSION: 0
LOSS OF SENSATION IN FEET: 0

## 2024-10-14 ASSESSMENT — PAIN SCALES - GENERAL: PAINLEVEL: 0-NO PAIN

## 2024-10-27 NOTE — PROGRESS NOTES
Department of Medicine  Division of Pulmonary, Critical Care, and Sleep Medicine  Consultation  [facility] - [address], [address]    I was asked by Dr. Ayoub, to evaluate Ms. Reyna Jones for COPD. I have independently interviewed and examined the patient in the office and reviewed available records.    Physician HPI:  Mrs. Jones is a 74-year-old female with past medical history of nicotine dependence with 30-pack-year of cigarette smoking, quit smoking in 2017, moderate COPD, paroxysmal atrial fibrillation, coronary artery disease, and HFpEF who presented to the office today to establish care for COPD.    Most recent pulmonary function test was done in April 2024 which revealed moderate range COPD with FEV1 57% of predicted.  DLCO was 70% of predicted.  Reyna has been on Stiolto Ellipta inhaler every day.  She reports occasional cough and wheezing with exertions.  No recent history of acute COPD exacerbation.  Cough is occasionally productive of clear sputum.  No hemoptysis.  No acute fevers or purulent sputum production.    Review of Systems   All other review of systems are negative and/or non-contributory.      Immunizations:  Immunization History   Administered Date(s) Administered    Flu vaccine, trivalent, preservative free, HIGH-DOSE, age 65y+ (Fluzone) 09/28/2017, 12/04/2018, 12/01/2020, 09/12/2024    Influenza, seasonal, injectable 02/16/2017    Becky SARS-CoV-2 Vaccination 03/17/2021, 11/09/2021    Pneumococcal conjugate vaccine, 13-valent (PREVNAR 13) 02/20/2017, 06/17/2021    Pneumococcal polysaccharide vaccine, 23-valent, age 2 years and older (PNEUMOVAX 23) 02/20/2017, 02/15/2018    Tdap vaccine, age 7 year and older (BOOSTRIX, ADACEL) 09/07/2021    Zoster vaccine, recombinant, adult (SHINGRIX) 06/16/2021    Zoster, live 09/08/2014       Past Medical History:  Past Medical History:   Diagnosis Date    Body mass index (BMI) 25.0-25.9, adult 06/10/2019    BMI 25.0-25.9,adult    Encounter  for immunization 09/29/2017    Encounter for immunization    Gastrointestinal hemorrhage, unspecified 02/28/2017    Acute GI bleeding    Other conditions influencing health status 12/31/2018    History of cough    Personal history of nicotine dependence     History of tobacco abuse    Personal history of nicotine dependence     History of nicotine dependence    Personal history of other diseases of the circulatory system 04/26/2019    History of atrial fibrillation    Personal history of other diseases of the digestive system 04/21/2017    History of gastrointestinal hemorrhage    Personal history of other diseases of the respiratory system 11/03/2017    History of acute sinusitis    Personal history of other diseases of the respiratory system     History of bronchitis    Unspecified conjunctivitis 10/07/2013    Conjunctivitis of left eye    Unspecified otitis externa, left ear 09/09/2014    Otitis externa, left       Past Surgical History:  Past Surgical History:   Procedure Laterality Date    CARDIAC ELECTROPHYSIOLOGY PROCEDURE N/A 3/26/2024    Procedure: Cardioversion;  Surgeon: James C Ramicone, DO;  Location: [facility] Cardiac [facility];  Service: Electrophysiology;  Laterality: N/A;    OTHER SURGICAL HISTORY  10/07/2013    Drainage Of Ovarian Cyst(S) - Abdominal Approach    OTHER SURGICAL HISTORY  12/04/2021    Hemorrhoidectomy    OTHER SURGICAL HISTORY  12/04/2021    Ovarian tumor removal    TONSILLECTOMY  09/08/2014    Tonsillectomy With Adenoidectomy       Family History:  Family History   Problem Relation Name Age of Onset    Other (cardiac disorder) Mother      Cancer Father      Rheumatic fever Sister      Stroke Brother      Other (cardiac arrhythemia) Brother      Hypertension Daughter         Social History:  Social History     Tobacco Use    Smoking status: Former     Types: Cigarettes    Smokeless tobacco: Never   Substance Use Topics    Alcohol use: Not Currently    Drug use: Never        Occupational &  "Environmental History:       Medications:  Current Outpatient Medications   Medication Instructions    albuterol (ProAir HFA) 90 mcg/actuation inhaler 2 puffs, inhalation, Every 6 hours PRN    CALCIUM-MAGNESIUM-ZINC ORAL 1 capsule, oral, Daily    COQ10, UBIQUINOL, ORAL 200 mg, oral, Daily    cyanocobalamin (VITAMIN B-12) 500 mcg, oral, Daily    furosemide (LASIX) 20 mg, oral, Daily    levothyroxine (SYNTHROID, LEVOXYL) 75 mcg, oral, Daily before breakfast    metFORMIN (OSM) (FORTAMET) 500 mg, oral, Daily with evening meal, Do not crush, chew, or split.    metoprolol succinate XL (TOPROL-XL) 50 mg, oral, 2 times daily, Do not crush or chew.    pantoprazole (PROTONIX) 40 mg, oral, Daily    rivaroxaban (XARELTO) 20 mg, oral, Daily with evening meal, Take with food.    rosuvastatin (CRESTOR) 10 mg, oral, Daily    tiotropium-olodateroL (Stiolto Respimat) 2.5-2.5 mcg/actuation mist inhaler 2 Inhalations, inhalation, Daily        Drug Allergies/Intolerances:  Allergies   Allergen Reactions    Propoxyphene N-Acetaminophen Unknown            Visit Vitals  BP 94/59   Pulse 69   Temp 36.9 °C (98.4 °F) (Temporal)   Ht 1.626 m (5' 4\")   Wt 63 kg (139 lb)   SpO2 97%   BMI 23.86 kg/m²   OB Status Postmenopausal   Smoking Status Former   BSA 1.69 m²        Physical Exam  Vitals and nursing note reviewed.   Constitutional:       General: She is not in acute distress.     Appearance: Normal appearance.   HENT:      Head: Normocephalic and atraumatic.      Nose: Nose normal.      Mouth/Throat:      Mouth: Mucous membranes are moist.   Eyes:      Conjunctiva/sclera: Conjunctivae normal.   Cardiovascular:      Rate and Rhythm: Normal rate.   Pulmonary:      Effort: Pulmonary effort is normal. No respiratory distress.      Breath sounds: Normal breath sounds. No stridor. No wheezing or rhonchi.   Musculoskeletal:      Cervical back: Normal range of motion and neck supple.   Skin:     General: Skin is warm and dry.      Coloration: Skin " is not jaundiced.   Neurological:      General: No focal deficit present.      Mental Status: She is alert and oriented to person, place, and time. Mental status is at baseline.   Psychiatric:         Mood and Affect: Mood normal.         Behavior: Behavior normal.         Judgment: Judgment normal.          Pulmonary Function Test Results                         Chest Radiograph     No results found for this or any previous visit from the past 2000 days.      Echocardiogram     No results found for this or any previous visit from the past 365 days.       Chest CT Scan     No results found for this or any previous visit from the past 365 days.       Laboratory Studies     Lab Results   Component Value Date    WBC 7.1 06/12/2024    HGB 13.4 06/12/2024    HCT 41.8 06/12/2024    MCV 95 06/12/2024     06/12/2024      Lab Results   Component Value Date    GLUCOSE 108 (H) 09/18/2024    CALCIUM 9.4 09/18/2024     09/18/2024    K 4.0 09/18/2024    CO2 34 (H) 09/18/2024    CL 97 (L) 09/18/2024    BUN 24 (H) 09/18/2024    CREATININE 1.04 09/18/2024      Lab Results   Component Value Date    ALT 10 09/18/2024    AST 22 09/18/2024    ALKPHOS 69 09/18/2024    BILITOT 0.5 09/18/2024          Assessment and Plan / Recommendations     COPD (moderate) with mMRC II  Nicotine dependence in remission  Chronic dyspnea on exertion    Plan:  Continue on Stiolto + Albuterol as needed  Advised to stay up to date with immunizations  LDCT for lung cancer screening is recommended.   Regular exercises - pulmonary rehab is deferred at this time  F/U in 6 months, sooner if necessary.     Galo Amaral MD  10/14/2024

## 2024-12-31 ENCOUNTER — APPOINTMENT (OUTPATIENT)
Dept: RADIOLOGY | Facility: HOSPITAL | Age: 74
End: 2024-12-31
Payer: MEDICARE

## 2025-01-27 ENCOUNTER — TELEPHONE (OUTPATIENT)
Dept: PULMONOLOGY | Facility: HOSPITAL | Age: 75
End: 2025-01-27
Payer: MEDICARE

## 2025-03-13 ENCOUNTER — APPOINTMENT (OUTPATIENT)
Dept: PRIMARY CARE | Facility: CLINIC | Age: 75
End: 2025-03-13
Payer: MEDICARE

## 2025-03-13 VITALS
HEIGHT: 64 IN | HEART RATE: 72 BPM | BODY MASS INDEX: 24.62 KG/M2 | WEIGHT: 144.2 LBS | DIASTOLIC BLOOD PRESSURE: 56 MMHG | TEMPERATURE: 97.2 F | RESPIRATION RATE: 16 BRPM | SYSTOLIC BLOOD PRESSURE: 94 MMHG

## 2025-03-13 DIAGNOSIS — Z09 ENCOUNTER FOR FOLLOW-UP: Primary | ICD-10-CM

## 2025-03-13 DIAGNOSIS — E78.2 MIXED HYPERLIPIDEMIA: ICD-10-CM

## 2025-03-13 DIAGNOSIS — E11.9 TYPE 2 DIABETES MELLITUS WITHOUT COMPLICATION, WITHOUT LONG-TERM CURRENT USE OF INSULIN (MULTI): ICD-10-CM

## 2025-03-13 DIAGNOSIS — I10 ESSENTIAL (PRIMARY) HYPERTENSION: ICD-10-CM

## 2025-03-13 DIAGNOSIS — J43.1 PANLOBULAR EMPHYSEMA (MULTI): ICD-10-CM

## 2025-03-13 DIAGNOSIS — I48.20 CHRONIC ATRIAL FIBRILLATION (MULTI): ICD-10-CM

## 2025-03-13 DIAGNOSIS — K21.9 GASTROESOPHAGEAL REFLUX DISEASE WITHOUT ESOPHAGITIS: ICD-10-CM

## 2025-03-13 DIAGNOSIS — I50.32 CHRONIC DIASTOLIC CONGESTIVE HEART FAILURE: ICD-10-CM

## 2025-03-13 DIAGNOSIS — E03.9 ACQUIRED HYPOTHYROIDISM: ICD-10-CM

## 2025-03-13 DIAGNOSIS — E11.40 CONTROLLED TYPE 2 DIABETES MELLITUS WITH DIABETIC NEUROPATHY, WITHOUT LONG-TERM CURRENT USE OF INSULIN: ICD-10-CM

## 2025-03-13 PROCEDURE — 3078F DIAST BP <80 MM HG: CPT | Performed by: FAMILY MEDICINE

## 2025-03-13 PROCEDURE — 1159F MED LIST DOCD IN RCRD: CPT | Performed by: FAMILY MEDICINE

## 2025-03-13 PROCEDURE — 3008F BODY MASS INDEX DOCD: CPT | Performed by: FAMILY MEDICINE

## 2025-03-13 PROCEDURE — G2211 COMPLEX E/M VISIT ADD ON: HCPCS | Performed by: FAMILY MEDICINE

## 2025-03-13 PROCEDURE — 3074F SYST BP LT 130 MM HG: CPT | Performed by: FAMILY MEDICINE

## 2025-03-13 PROCEDURE — 1123F ACP DISCUSS/DSCN MKR DOCD: CPT | Performed by: FAMILY MEDICINE

## 2025-03-13 PROCEDURE — 1036F TOBACCO NON-USER: CPT | Performed by: FAMILY MEDICINE

## 2025-03-13 PROCEDURE — 99214 OFFICE O/P EST MOD 30 MIN: CPT | Performed by: FAMILY MEDICINE

## 2025-03-13 ASSESSMENT — ANXIETY QUESTIONNAIRES
3. WORRYING TOO MUCH ABOUT DIFFERENT THINGS: NOT AT ALL
2. NOT BEING ABLE TO STOP OR CONTROL WORRYING: NOT AT ALL
5. BEING SO RESTLESS THAT IT IS HARD TO SIT STILL: NOT AT ALL
IF YOU CHECKED OFF ANY PROBLEMS ON THIS QUESTIONNAIRE, HOW DIFFICULT HAVE THESE PROBLEMS MADE IT FOR YOU TO DO YOUR WORK, TAKE CARE OF THINGS AT HOME, OR GET ALONG WITH OTHER PEOPLE: NOT DIFFICULT AT ALL
6. BECOMING EASILY ANNOYED OR IRRITABLE: NOT AT ALL
4. TROUBLE RELAXING: NOT AT ALL
GAD7 TOTAL SCORE: 0
1. FEELING NERVOUS, ANXIOUS, OR ON EDGE: NOT AT ALL
7. FEELING AFRAID AS IF SOMETHING AWFUL MIGHT HAPPEN: NOT AT ALL

## 2025-03-13 ASSESSMENT — PATIENT HEALTH QUESTIONNAIRE - PHQ9
2. FEELING DOWN, DEPRESSED OR HOPELESS: NOT AT ALL
SUM OF ALL RESPONSES TO PHQ9 QUESTIONS 1 & 2: 0
1. LITTLE INTEREST OR PLEASURE IN DOING THINGS: NOT AT ALL

## 2025-03-13 ASSESSMENT — ENCOUNTER SYMPTOMS
OCCASIONAL FEELINGS OF UNSTEADINESS: 0
LOSS OF SENSATION IN FEET: 0
DEPRESSION: 0

## 2025-03-13 NOTE — PROGRESS NOTES
Hank Kim   Patient ID: Reyna Jones is a 75 y.o. female who presents for Follow Up of Hyperlipidemia, Hypertension and Diabetes . I last saw the patient on 9/12/2024  .     HPI  Patient has no medical complaints today or refill requests for rooming MA.    Past medical, surgical, and family history reviewed.  Reviewed and documented all medications   Pt eating well, exercising as tolerated and taking medications as directed.      The patient was denied a CT scan and was denied due to her insurance which is why she is not sure what is going on. She has not had it done and needs it done before her appointment with Dr. Wise in pulmonology.    The patient is trying to stay active and healthy. They are currently exercising and remaining physically active. The aare maintaining a heathy diet that includes green leafy vegetables, fruits and proteins. They arestaying well hydrated.    The patient denies any changes in vision, hearing or dental.     The patient maintains they do not have any chest pain, chest tightness or shortness of breath.    They do not experience nausea, emesis, changes in bowel movements or dyspepsia.    The patient denies changes in or worsening of moods.    The patient's colonoscopy is up to date.    The patient's mammogram is not up to date.    The patient's vaccinations are up to date.      Review of Systems  Except positives as noted in the CC & HPI      Constitutional: Denies fevers, chills, night sweats, fatigue, weight changes, change in appetite    Eyes: Denies blurry vision, double vision    ENT: Denies otalgia, trouble hearing, tinnitus, vertigo, nasal congestion, rhinorrhea, sore throat    Neck: Denies swelling, masses    Cardiovascular: Denies chest pain, palpitations, edema, orthopnea, syncope    Respiratory: Denies dyspnea, cough, wheezing, postural nocturnal dyspnea    Gastrointestinal: Denies abdominal pain, nausea, vomiting, diarrhea, constipation, melena, hematochezia     Genitourinary: Denies dysuria, hematuria  Musculoskeletal: Denies back pain, neck pain, arthralgias, myalgias    Integumentary: Denies skin lesions, rashes, masses    Neurological: Denies dizziness, headaches, confusion, limb weakness, paresthesias, syncope, convulsions    Psychiatric: Denies depression, anxiety, homicidal ideations, suicidal ideations, sleep disturbances    Endocrine: Denies polyphagia, polydipsia, polyuria, weakness, hair thinning, heat intolerance, cold intolerance, weight changes    Heme/Lymph: Denies easy bruising, easy bleeding, swollen glands    Objective     Physical Exam  Gen. Appearance - well-developed, well-nourished in no acute distress.     Skin - warm and dry without rash or concerning lesions.     Mental Status - alert and oriented times 3. Normal mood and affect appropriate to mood.     Neck - supple without lymphadenopathy. Carotid pulses are normal without bruits. Thyroid is normal in midline without nodules.    Chest - lungs are clear to auscultation without rales, rhonchi or wheezes.     Heart - regular, rate, and rhythm without murmurs, rubs or gallops.     Abdomen - soft, flat, nondistended. No masses, hepatomegaly or splenomegaly is noted. No rebound, rigidity or guarding is noted. Bowel sounds are normoactive.     Extremities - no cyanosis, clubbing. Pedal pulses are 2+ normal at the dorsalis pedis and posterior tibial pulses bilaterally.     Neurological - cranial nerves II through XII are grossly intact. Motor strength 5/5 at all fours.     Assessment   1. Encounter for follow-up        2. Type 2 diabetes mellitus without complication, without long-term current use of insulin (Multi)  Referral to Clinical Pharmacy      3. Mixed hyperlipidemia        4. Essential (primary) hypertension        5. Acquired hypothyroidism        6. Gastroesophageal reflux disease without esophagitis        7. Panlobular emphysema (Multi)  Referral to Clinical Pharmacy      8. Body mass index  (BMI) of 24.0 to 24.9 in adult        9. Controlled type 2 diabetes mellitus with diabetic neuropathy, without long-term current use of insulin        10. Chronic diastolic congestive heart failure        11. Chronic atrial fibrillation (Multi)          For weight management I recommend exercising and remaining physically active. Keep walking,  exercising being active 30 minutes a day is ideal. Try to maintain a heathy diet that includes green leafy vegetables, fruits and proteins. Try to limit alcohol consumption due to excess of sugars and calories which could hinder weight loss. Also try to limit nighttime snacking. Continue eating a heart healthy diet a good goal 5-7 servings of fresh fruit and vegetable every day along with lean protein avoid simple sugars avoid fast foods. You are encouraged to stay well hydrated.     Patient was advised to limit their salt intake and to not add any extra salt to their food. Patient is to avoid pretzels, chips, lunch meats, canned soups, soda pop, ham, devlin, hot dogs, etc.    Patient will continue on a diabetic, low-cholesterol diet and weight reduction. Exercise as tolerated.     Encouraged patient to drink plenty of fluids, at least 64 oz of water daily.      I have put in a referral for you to see clinical pharmacy    Patient to continue current medications (with any exceptions as noted) and diet. Follow-up in 3 month(s) otherwise as needed.     Please keep all future appointments with Dr. Mobley in cardiology and Dr. Wise in pulmonology.    Scribe Attestation  By signing my name below, ISharifa Scribe   attest that this documentation has been prepared under the direction and in the presence of Jeffery Ayoub MD.    This note has been transcribed using a medical scribe and there is a possibility of unintentional typing misprints.    All medical record entries made by the scribe were at my direction and personally dictated by me. I have reviewed the chart and agree  that the record accurately reflects my personal performance of the history, physical exam, discussion, and plan.    Jeffery Ayoub M.D.

## 2025-03-14 NOTE — ASSESSMENT & PLAN NOTE
Controlled type 2 diabetes mellitus with diabetic neuropathy, without long-term current use of insulin is stable.  Patient to continue with current medications and treatment plan.  Follow-up at least annually.

## 2025-03-14 NOTE — ASSESSMENT & PLAN NOTE
Chronic diastolic congestive heart failure is stable.  Patient to continue with current medications and treatment plan.  Follow-up at least annually.  Follow-up with cardiology as scheduled.

## 2025-03-14 NOTE — ASSESSMENT & PLAN NOTE
Unspecified atrial fibrillation is stable.  Patient to continue with current medications and treatment plan.  Follow-up at least annually.  Follow-up with cardiology as scheduled.

## 2025-03-17 PROBLEM — E11.42 DIABETIC PERIPHERAL NEUROPATHY (MULTI): Status: ACTIVE | Noted: 2025-03-13

## 2025-03-25 ENCOUNTER — TELEPHONE (OUTPATIENT)
Facility: CLINIC | Age: 75
End: 2025-03-25
Payer: MEDICARE

## 2025-03-25 NOTE — TELEPHONE ENCOUNTER
Patient called in asking what she should do since the CT scan was denied. Please advise as she has an appointment with you on 4/14/25.    Thanks

## 2025-03-28 DIAGNOSIS — F17.211 CIGARETTE NICOTINE DEPENDENCE IN REMISSION: Primary | ICD-10-CM

## 2025-03-28 NOTE — TELEPHONE ENCOUNTER
Spoke with patient and she said that the insurance isn't wanting to pay or approve because of the dx code. Which needs to be changed so can get done

## 2025-04-06 NOTE — PROGRESS NOTES
Subjective   Reyna Jones is a 75 y.o. female.    Chief Complaint:  Shortness of breath.  Follow-up paroxysmal atrial fibrillation.    HPI    Over the past week she has noted increased cough with sputum production and increasing shortness of breath.  Has noted some decrease in her oxygen levels.    From a cardiac standpoint she denies palpitations.  No chest pressure or chest heaviness.  No bleeding issues.    She presented on December 28, 2023 to [facility] with shortness of breath and palpitations.  She was having gradual onset of increasing shortness of breath.  She was noted to be hypoxic in the emergency room.  She was admitted to the hospital and treated with IV diuretics.  Her dose of metoprolol was increased.  She was continued on Xarelto.  Since discharge she has felt better.  Shortness of breath is significantly improved.  Denies palpitations.  Gets fatigued very easily.     In 2022 she presented with atrial flutter with a rapid ventricular response. She underwent cardioversion at 50 J which was successful.     Her diagnoses are coronary artery disease is based on the finding of a positive calcium score of 848. This was done in 2019.     She presented on July 10, 2019 with gastrointestinal bleeding. She presented with a hemoglobin of 6.0. She was treated with transfusions. She is noted to be in sinus rhythm. She had upper and lower endoscopy which did not reveal a bleeding source.     An echocardiographic study performed on July 11, 2019 demonstrated normal left ventricular systolic function. Left atrial size was essentially normal. There was significant valvular disease with moderate mitral regurgitation and moderate tricuspid regurgitation.     The patient presented to [facility] on December 20, 2018 with shortness of breath and dyspnea. She is noted to be in atrial fibrillation with a rapid ventricular response.      Past medical history significant for  hypothyroidism and a gastroesophageal reflux condition.      Allergies  Medication    · Darvocet-N 100 TABS     Family History  Mother    · Family history of cardiac disorder (V17.49) (Z82.49)     Social History  Problems    · Exercises regularly   · Former smoker (V15.82) (Z87.891)        Review of Systems   Constitutional: Positive for malaise/fatigue.   Cardiovascular:  Positive for dyspnea on exertion.   Respiratory:  Positive for shortness of breath.  Positive for cough and sputum production.  Musculoskeletal:  Positive for arthritis.   All other systems reviewed and are negative.      Current Outpatient Medications   Medication Sig Dispense Refill    CALCIUM-MAGNESIUM-ZINC ORAL Take 1 capsule by mouth once daily.      COQ10, UBIQUINOL, ORAL Take 200 mg by mouth once daily.      cyanocobalamin (Vitamin B-12) 500 mcg tablet Take 1 tablet (500 mcg) by mouth once daily.      furosemide (Lasix) 20 mg tablet Take 1 tablet (20 mg) by mouth once daily. 90 tablet 3    levothyroxine (Synthroid, Levoxyl) 75 mcg tablet Take 1 tablet (75 mcg) by mouth once daily in the morning. Take before meals.      metFORMIN, OSM, (Fortamet) 500 mg 24 hr tablet Take 1 tablet (500 mg) by mouth once daily in the evening. Take with meals. Do not crush, chew, or split.      metoprolol succinate XL (Toprol-XL) 50 mg 24 hr tablet Take 1 tablet (50 mg) by mouth 2 times a day. Do not crush or chew. 180 tablet 3    pantoprazole (ProtoNix) 40 mg EC tablet Take 1 tablet (40 mg) by mouth once daily. 90 tablet 3    rosuvastatin (Crestor) 10 mg tablet Take 1 tablet (10 mg) by mouth once daily.      tiotropium-olodateroL (Stiolto Respimat) 2.5-2.5 mcg/actuation mist inhaler Inhale 2 Inhalations once daily. 4 g 11    azithromycin (Zithromax) 500 mg tablet Take 1 tablet (500 mg) by mouth once daily for 5 days. 5 tablet 0    methylPREDNISolone (Medrol Dospak) 4 mg tablets Follow schedule on package instructions 21 tablet 0    rivaroxaban (Xarelto) 20 mg  "tablet Take 1 tablet (20 mg) by mouth once daily in the evening. Take with meals. Take with food. 90 tablet 3     No current facility-administered medications for this visit.        Visit Vitals  /70 (BP Location: Left arm)   Pulse 86   Ht 1.626 m (5' 4\")   Wt 64.9 kg (143 lb)   SpO2 93%   BMI 24.55 kg/m²   OB Status Postmenopausal   Smoking Status Former   BSA 1.71 m²        Objective     Constitutional:       Appearance: Not in distress.   Neck:      Vascular: JVD normal.   Pulmonary:      Breath sounds: Decreased breath sounds present.   Cardiovascular:      Normal rate. Regular rhythm. Normal S1. Normal S2.       Murmurs: There is no murmur.      No gallop.    Pulses:     Intact distal pulses.   Edema:     Peripheral edema absent.   Abdominal:      General: There is no distension.      Palpations: Abdomen is soft.   Neurological:      Mental Status: Alert.         Lab Review:   Lab Results   Component Value Date     09/18/2024    K 4.0 09/18/2024    CL 97 (L) 09/18/2024    CO2 34 (H) 09/18/2024    BUN 24 (H) 09/18/2024    CREATININE 1.04 09/18/2024    GLUCOSE 108 (H) 09/18/2024    CALCIUM 9.4 09/18/2024     Lab Results   Component Value Date    CHOL 174 09/18/2024    TRIG 177 (H) 09/18/2024    HDL 48.5 09/18/2024    LDLDIRECT 73 09/10/2021       Assessment:    1.  Coronary disease.  Extensive coronary disease on the basis of prior coronary CT calcium scoring.  Continue conservative medical management.    2.  Paroxysmal atrial flutter.  Maintaining sinus rhythm.  Today's EKG demonstrates sinus rhythm.  Will continue anticoagulation therapy as there is a high probability that she will go back into atrial fibrillation.    3.  Hyperlipidemia followed by primary care.  Needs follow-up labs.    4.  Aortic stenosis and mitral regurgitation.  Mild aortic stenosis and moderate mitral regurgitation.  Will do follow-up echo study in the future.    5.  COPD with exacerbation.  Ordered Zithromax and Medrol " Dosepak.

## 2025-04-07 ENCOUNTER — APPOINTMENT (OUTPATIENT)
Dept: CARDIOLOGY | Facility: CLINIC | Age: 75
End: 2025-04-07
Payer: MEDICARE

## 2025-04-07 VITALS
HEART RATE: 86 BPM | SYSTOLIC BLOOD PRESSURE: 110 MMHG | BODY MASS INDEX: 24.41 KG/M2 | DIASTOLIC BLOOD PRESSURE: 70 MMHG | OXYGEN SATURATION: 93 % | HEIGHT: 64 IN | WEIGHT: 143 LBS

## 2025-04-07 DIAGNOSIS — R94.31 ABNORMAL EKG: ICD-10-CM

## 2025-04-07 DIAGNOSIS — I10 ESSENTIAL (PRIMARY) HYPERTENSION: Primary | ICD-10-CM

## 2025-04-07 DIAGNOSIS — J44.1 CHRONIC OBSTRUCTIVE PULMONARY DISEASE WITH ACUTE EXACERBATION (MULTI): ICD-10-CM

## 2025-04-07 DIAGNOSIS — I48.0 PAROXYSMAL ATRIAL FIBRILLATION (MULTI): ICD-10-CM

## 2025-04-07 DIAGNOSIS — I25.10 CORONARY ARTERY DISEASE INVOLVING NATIVE CORONARY ARTERY OF NATIVE HEART WITHOUT ANGINA PECTORIS: ICD-10-CM

## 2025-04-07 DIAGNOSIS — J42 CHRONIC BRONCHITIS, UNSPECIFIED CHRONIC BRONCHITIS TYPE (MULTI): ICD-10-CM

## 2025-04-07 DIAGNOSIS — I07.1 MODERATE TRICUSPID REGURGITATION: ICD-10-CM

## 2025-04-07 DIAGNOSIS — E78.2 MIXED HYPERLIPIDEMIA: ICD-10-CM

## 2025-04-07 DIAGNOSIS — I34.0 MODERATE MITRAL REGURGITATION: ICD-10-CM

## 2025-04-07 DIAGNOSIS — I50.32 CHRONIC DIASTOLIC CONGESTIVE HEART FAILURE: ICD-10-CM

## 2025-04-07 LAB
ATRIAL RATE: 73 BPM
P AXIS: 79 DEGREES
P OFFSET: 191 MS
P ONSET: 144 MS
PR INTERVAL: 168 MS
Q ONSET: 228 MS
QRS COUNT: 12 BEATS
QRS DURATION: 82 MS
QT INTERVAL: 372 MS
QTC CALCULATION(BAZETT): 409 MS
QTC FREDERICIA: 397 MS
R AXIS: 35 DEGREES
T AXIS: 97 DEGREES
T OFFSET: 414 MS
VENTRICULAR RATE: 73 BPM

## 2025-04-07 PROCEDURE — 1123F ACP DISCUSS/DSCN MKR DOCD: CPT | Performed by: INTERNAL MEDICINE

## 2025-04-07 PROCEDURE — 3008F BODY MASS INDEX DOCD: CPT | Performed by: INTERNAL MEDICINE

## 2025-04-07 PROCEDURE — 1159F MED LIST DOCD IN RCRD: CPT | Performed by: INTERNAL MEDICINE

## 2025-04-07 PROCEDURE — 99214 OFFICE O/P EST MOD 30 MIN: CPT | Performed by: INTERNAL MEDICINE

## 2025-04-07 PROCEDURE — 3078F DIAST BP <80 MM HG: CPT | Performed by: INTERNAL MEDICINE

## 2025-04-07 PROCEDURE — 3074F SYST BP LT 130 MM HG: CPT | Performed by: INTERNAL MEDICINE

## 2025-04-07 PROCEDURE — 93005 ELECTROCARDIOGRAM TRACING: CPT | Performed by: INTERNAL MEDICINE

## 2025-04-07 PROCEDURE — 1036F TOBACCO NON-USER: CPT | Performed by: INTERNAL MEDICINE

## 2025-04-07 RX ORDER — AZITHROMYCIN 500 MG/1
500 TABLET, FILM COATED ORAL DAILY
Qty: 5 TABLET | Refills: 0 | Status: SHIPPED | OUTPATIENT
Start: 2025-04-07 | End: 2025-04-12

## 2025-04-07 RX ORDER — METHYLPREDNISOLONE 4 MG/1
TABLET ORAL
Qty: 21 TABLET | Refills: 0 | Status: SHIPPED | OUTPATIENT
Start: 2025-04-07 | End: 2025-04-13

## 2025-04-07 NOTE — PATIENT INSTRUCTIONS
Start zithromax 500 mg one daily for 5 days    Take a Medrol dospak as directed.    No changes oin your heat medications

## 2025-04-14 ENCOUNTER — APPOINTMENT (OUTPATIENT)
Dept: PULMONOLOGY | Facility: CLINIC | Age: 75
End: 2025-04-14

## 2025-04-14 VITALS
SYSTOLIC BLOOD PRESSURE: 117 MMHG | BODY MASS INDEX: 24.04 KG/M2 | HEART RATE: 67 BPM | WEIGHT: 140.8 LBS | HEIGHT: 64 IN | DIASTOLIC BLOOD PRESSURE: 68 MMHG | OXYGEN SATURATION: 92 % | RESPIRATION RATE: 18 BRPM

## 2025-04-14 DIAGNOSIS — J41.8 MIXED SIMPLE AND MUCOPURULENT CHRONIC BRONCHITIS (MULTI): ICD-10-CM

## 2025-04-14 PROCEDURE — 1036F TOBACCO NON-USER: CPT | Performed by: INTERNAL MEDICINE

## 2025-04-14 PROCEDURE — 3074F SYST BP LT 130 MM HG: CPT | Performed by: INTERNAL MEDICINE

## 2025-04-14 PROCEDURE — 99214 OFFICE O/P EST MOD 30 MIN: CPT | Performed by: INTERNAL MEDICINE

## 2025-04-14 PROCEDURE — 3078F DIAST BP <80 MM HG: CPT | Performed by: INTERNAL MEDICINE

## 2025-04-14 PROCEDURE — 1159F MED LIST DOCD IN RCRD: CPT | Performed by: INTERNAL MEDICINE

## 2025-04-14 PROCEDURE — 3008F BODY MASS INDEX DOCD: CPT | Performed by: INTERNAL MEDICINE

## 2025-04-14 PROCEDURE — 1123F ACP DISCUSS/DSCN MKR DOCD: CPT | Performed by: INTERNAL MEDICINE

## 2025-04-14 PROCEDURE — G8433 SCR FOR DEP NOT CPT DOC RSN: HCPCS | Performed by: INTERNAL MEDICINE

## 2025-04-14 RX ORDER — TIOTROPIUM BROMIDE AND OLODATEROL 3.124; 2.736 UG/1; UG/1
2 SPRAY, METERED RESPIRATORY (INHALATION) DAILY
Qty: 4 G | Refills: 11 | Status: SHIPPED | OUTPATIENT
Start: 2025-04-14 | End: 2026-04-09

## 2025-04-14 ASSESSMENT — COPD QUESTIONNAIRES
QUESTION4_WALKINCLINE: 4
QUESTION2_CHESTPHLEGM: 1
CAT_TOTALSCORE: 13
QUESTION7_SLEEPQUALITY: 0 - I SLEEP SOUNDLY
QUESTION1_COUGHFREQUENCY: 2
QUESTION6_LEAVINGHOUSE: 0 - I AM CONFIDENT LEAVING MY HOME DESPITE MY LUNG CONDITION
QUESTION5_HOMEACTIVITIES: 1
QUESTION3_CHESTTIGHTNESS: 0 - MY CHEST DOES NOT FEEL TIGHT AT ALL
QUESTION8_ENERGYLEVEL: 5 - I HAVE NO ENERGY AT ALL

## 2025-04-14 ASSESSMENT — ENCOUNTER SYMPTOMS
OCCASIONAL FEELINGS OF UNSTEADINESS: 0
DEPRESSION: 0
LOSS OF SENSATION IN FEET: 0

## 2025-04-14 ASSESSMENT — PATIENT HEALTH QUESTIONNAIRE - PHQ9
1. LITTLE INTEREST OR PLEASURE IN DOING THINGS: NOT AT ALL
2. FEELING DOWN, DEPRESSED OR HOPELESS: NOT AT ALL
SUM OF ALL RESPONSES TO PHQ9 QUESTIONS 1 AND 2: 0

## 2025-04-14 ASSESSMENT — COLUMBIA-SUICIDE SEVERITY RATING SCALE - C-SSRS
6. HAVE YOU EVER DONE ANYTHING, STARTED TO DO ANYTHING, OR PREPARED TO DO ANYTHING TO END YOUR LIFE?: NO
2. HAVE YOU ACTUALLY HAD ANY THOUGHTS OF KILLING YOURSELF?: NO
1. IN THE PAST MONTH, HAVE YOU WISHED YOU WERE DEAD OR WISHED YOU COULD GO TO SLEEP AND NOT WAKE UP?: NO

## 2025-04-14 NOTE — PROGRESS NOTES
Department of Medicine  Division of Pulmonary, Critical Care, and Sleep Medicine  Consultation  [facility] - [address], [address]    I was asked by Dr. Ayoub, to evaluate Ms. Reyna Jones for COPD. I have independently interviewed and examined the patient in the office and reviewed available records.    Physician HPI:  Mrs. Jones is a 74-year-old female with past medical history of nicotine dependence with 30-pack-year of cigarette smoking, quit smoking in 2017, moderate COPD, paroxysmal atrial fibrillation, coronary artery disease, and HFpEF who presented to the office today to establish care for COPD.    Most recent pulmonary function test was done in April 2024 which revealed moderate range COPD with FEV1 57% of predicted.  DLCO was 70% of predicted.  Reyna has been on Stiolto Ellipta inhaler every day.  She reports occasional cough and wheezing with exertions.  No recent history of acute COPD exacerbation.  Cough is occasionally productive of clear sputum.  No hemoptysis.  No acute fevers or purulent sputum production.    Follow-up 04/14/25:  Reyna reports stable respiratory status since last visit.  She had acute bronchitis about a week ago that resolved after course of Z-Andi.  No interval history of acute COPD exacerbation or ER visits.  Using LABA/LAMA every day.  Did not need to use albuterol.  Denies heartburn.  She is scheduled for low-dose CT scan of chest in 2 weeks from now.    Review of Systems   All other review of systems are negative and/or non-contributory.      Immunizations:  Immunization History   Administered Date(s) Administered    Flu vaccine, trivalent, preservative free, HIGH-DOSE, age 65y+ (Fluzone) 09/28/2017, 12/04/2018, 12/01/2020, 09/12/2024    Influenza, seasonal, injectable 02/16/2017    Becky SARS-CoV-2 Vaccination 03/17/2021, 11/09/2021    Pneumococcal conjugate vaccine, 13-valent (PREVNAR 13) 02/20/2017, 06/17/2021    Pneumococcal polysaccharide vaccine,  23-valent, age 2 years and older (PNEUMOVAX 23) 02/20/2017, 02/15/2018    Tdap vaccine, age 7 year and older (BOOSTRIX, ADACEL) 09/07/2021    Zoster vaccine, recombinant, adult (SHINGRIX) 06/16/2021, 03/05/2025    Zoster, live 09/08/2014       Past Medical History:  Past Medical History:   Diagnosis Date    Body mass index (BMI) 25.0-25.9, adult 06/10/2019    BMI 25.0-25.9,adult    Encounter for immunization 09/29/2017    Encounter for immunization    Gastrointestinal hemorrhage, unspecified 02/28/2017    Acute GI bleeding    Other conditions influencing health status 12/31/2018    History of cough    Personal history of nicotine dependence     History of tobacco abuse    Personal history of nicotine dependence     History of nicotine dependence    Personal history of other diseases of the circulatory system 04/26/2019    History of atrial fibrillation    Personal history of other diseases of the digestive system 04/21/2017    History of gastrointestinal hemorrhage    Personal history of other diseases of the respiratory system 11/03/2017    History of acute sinusitis    Personal history of other diseases of the respiratory system     History of bronchitis    Unspecified conjunctivitis 10/07/2013    Conjunctivitis of left eye    Unspecified otitis externa, left ear 09/09/2014    Otitis externa, left       Past Surgical History:  Past Surgical History:   Procedure Laterality Date    CARDIAC ELECTROPHYSIOLOGY PROCEDURE N/A 3/26/2024    Procedure: Cardioversion;  Surgeon: James C Ramicone, DO;  Location: [facility];  Service: Electrophysiology;  Laterality: N/A;    OTHER SURGICAL HISTORY  10/07/2013    Drainage Of Ovarian Cyst(S) - Abdominal Approach    OTHER SURGICAL HISTORY  12/04/2021    Hemorrhoidectomy    OTHER SURGICAL HISTORY  12/04/2021    Ovarian tumor removal    TONSILLECTOMY  09/08/2014    Tonsillectomy With Adenoidectomy       Family History:  Family History   Problem Relation Name Age of Onset     "Other (cardiac disorder) Mother      Cancer Father      Rheumatic fever Sister      Stroke Brother      Other (cardiac arrhythemia) Brother      Hypertension Daughter         Social History:  Social History     Tobacco Use    Smoking status: Former     Types: Cigarettes    Smokeless tobacco: Never   Substance Use Topics    Alcohol use: Not Currently    Drug use: Never        Occupational & Environmental History:       Medications:  Current Outpatient Medications   Medication Instructions    CALCIUM-MAGNESIUM-ZINC ORAL 1 capsule, Daily    COQ10, UBIQUINOL, ORAL 200 mg, Daily    cyanocobalamin (VITAMIN B-12) 500 mcg, Daily    furosemide (LASIX) 20 mg, oral, Daily    levothyroxine (SYNTHROID, LEVOXYL) 75 mcg, oral, Daily before breakfast    metFORMIN (OSM) (FORTAMET) 500 mg, Daily with evening meal    metoprolol succinate XL (TOPROL-XL) 50 mg, oral, 2 times daily, Do not crush or chew.    pantoprazole (PROTONIX) 40 mg, oral, Daily    rivaroxaban (XARELTO) 20 mg, oral, Daily with evening meal, Take with food.    rosuvastatin (CRESTOR) 10 mg, Daily    tiotropium-olodateroL (Stiolto Respimat) 2.5-2.5 mcg/actuation mist inhaler 2 Inhalations, inhalation, Daily        Drug Allergies/Intolerances:  Allergies   Allergen Reactions    Propoxyphene N-Acetaminophen Unknown            Visit Vitals  /68   Pulse 67   Resp 18   Ht 1.626 m (5' 4\")   Wt 63.9 kg (140 lb 12.8 oz)   SpO2 92%   BMI 24.17 kg/m²   OB Status Postmenopausal   Smoking Status Former   BSA 1.7 m²        Physical Exam  Vitals and nursing note reviewed.   Constitutional:       General: She is not in acute distress.     Appearance: Normal appearance.   HENT:      Head: Normocephalic and atraumatic.      Nose: Nose normal.      Mouth/Throat:      Mouth: Mucous membranes are moist.   Eyes:      Conjunctiva/sclera: Conjunctivae normal.   Cardiovascular:      Rate and Rhythm: Normal rate.   Pulmonary:      Effort: Pulmonary effort is normal. No respiratory " distress.      Breath sounds: Normal breath sounds. No stridor. No wheezing or rhonchi.   Musculoskeletal:      Cervical back: Neck supple.   Skin:     Coloration: Skin is not jaundiced.   Neurological:      General: No focal deficit present.      Mental Status: She is alert and oriented to person, place, and time. Mental status is at baseline.   Psychiatric:         Mood and Affect: Mood normal.         Behavior: Behavior normal.         Judgment: Judgment normal.          Pulmonary Function Test Results                         Chest Radiograph     No results found for this or any previous visit from the past 2000 days.      Echocardiogram     No results found for this or any previous visit from the past 365 days.       Chest CT Scan     No results found for this or any previous visit from the past 365 days.       Laboratory Studies     Lab Results   Component Value Date    WBC 7.1 06/12/2024    HGB 13.4 06/12/2024    HCT 41.8 06/12/2024    MCV 95 06/12/2024     06/12/2024      Lab Results   Component Value Date    GLUCOSE 108 (H) 09/18/2024    CALCIUM 9.4 09/18/2024     09/18/2024    K 4.0 09/18/2024    CO2 34 (H) 09/18/2024    CL 97 (L) 09/18/2024    BUN 24 (H) 09/18/2024    CREATININE 1.04 09/18/2024      Lab Results   Component Value Date    ALT 10 09/18/2024    AST 22 09/18/2024    ALKPHOS 69 09/18/2024    BILITOT 0.5 09/18/2024          Assessment and Plan / Recommendations     COPD (moderate) with mMRC II  Nicotine dependence in remission  Chronic dyspnea on exertion    Plan:  Continue on Stiolto + Albuterol as needed  Advised to stay up to date with immunizations  LDCT for lung cancer screening is recommended.   Start pulmonary rehab  F/U in 6 months, sooner if necessary.     Galo Amaral MD  04/14/2025

## 2025-04-23 ENCOUNTER — HOSPITAL ENCOUNTER (OUTPATIENT)
Dept: RADIOLOGY | Facility: HOSPITAL | Age: 75
Discharge: HOME | End: 2025-04-23
Payer: MEDICARE

## 2025-04-23 DIAGNOSIS — F17.211 CIGARETTE NICOTINE DEPENDENCE IN REMISSION: ICD-10-CM

## 2025-04-23 PROCEDURE — 71271 CT THORAX LUNG CANCER SCR C-: CPT

## 2025-04-23 PROCEDURE — 71271 CT THORAX LUNG CANCER SCR C-: CPT | Performed by: RADIOLOGY

## 2025-05-19 ENCOUNTER — PATIENT OUTREACH (OUTPATIENT)
Dept: PRIMARY CARE | Facility: CLINIC | Age: 75
End: 2025-05-19
Payer: MEDICARE

## 2025-05-19 RX ORDER — DEXAMETHASONE SODIUM PHOSPHATE 1 MG/ML
3 SOLUTION/ DROPS OPHTHALMIC
COMMUNITY
Start: 2025-05-18 | End: 2025-05-25

## 2025-05-19 RX ORDER — CIPROFLOXACIN HYDROCHLORIDE 3 MG/ML
3 SOLUTION/ DROPS OPHTHALMIC
COMMUNITY
Start: 2025-05-18 | End: 2025-05-25

## 2025-05-19 NOTE — PROGRESS NOTES
"Discharge Facility: [facility]  Discharge Diagnosis: Dizziness  Admission Date: 5/15/25  Discharge Date: 5/18/25    PCP Appointment Date:  5/21/25  Specialist Appointment Date:   - Vestibular therapy  - ZACKARY LEONARDO, (Otolaryngology); Please make sure to mention \"WITH ENG/Audiogram testing\" so that inner ear function testing can be performed   - Pulmonology  Hospital Encounter and Summary Linked: Yes  Unknown   See discharge assessment below for further details    Wrap Up  Wrap Up Additional Comments: Patient states she got home late last night but is feeling better; she is staying with her sister right now. Has oxygen in place as well as a walker. Reviewed medication changes. Assisted to schedule follow up with PCP. Denies further questions/concerns at this time. (5/19/2025  9:04 AM)    Medications  Medications reviewed with patient/caregiver?: Yes (new/changes only) (5/19/2025  9:04 AM)  Is the patient having any side effects they believe may be caused by any medication additions or changes?: No (5/19/2025  9:04 AM)  Does the patient have all medications ordered at discharge?: Yes (5/19/2025  9:04 AM)  Care Management Interventions: No intervention needed (5/19/2025  9:04 AM)  Prescription Comments: START: ciprofloxacin solution, dexamethasone solution, afrin, flonase (5/19/2025  9:04 AM)  Is the patient taking all medications as directed (includes completed medication regime)?: Yes (5/19/2025  9:04 AM)  Care Management Interventions: Provided patient education (5/19/2025  9:04 AM)    Appointments  Does the patient have a primary care provider?: Yes (5/19/2025  9:04 AM)  Care Management Interventions: Verified appointment date/time/provider (5/19/2025  9:04 AM)  Has the patient kept scheduled appointments due by today?: Yes (5/19/2025  9:04 AM)  Care Management Interventions: Advised patient to keep appointment (5/19/2025  9:04 AM)    Self Management  What is the home health agency?: [facility] (5/19/2025  " 9:04 AM)  Has home health visited the patient within 72 hours of discharge?: No (5/19/2025  9:04 AM)  What Durable Medical Equipment (DME) was ordered?: Oxygen at bedtime, walker (5/19/2025  9:04 AM)  Has all Durable Medical Equipment (DME) been delivered?: Yes (5/19/2025  9:04 AM)    Patient Teaching  Does the patient have access to their discharge instructions?: Yes (5/19/2025  9:04 AM)  Care Management Interventions: Reviewed instructions with patient (5/19/2025  9:04 AM)  What is the patient's perception of their health status since discharge?: Improving (5/19/2025  9:04 AM)  Is the patient/caregiver able to teach back the hierarchy of who to call/visit for symptoms/problems? PCP, Specialist, Home Health nurse, Urgent Care, ED, 911: Yes (5/19/2025  9:04 AM)

## 2025-05-21 ENCOUNTER — OFFICE VISIT (OUTPATIENT)
Dept: PRIMARY CARE | Facility: CLINIC | Age: 75
End: 2025-05-21
Payer: MEDICARE

## 2025-05-21 VITALS
RESPIRATION RATE: 16 BRPM | OXYGEN SATURATION: 99 % | HEART RATE: 72 BPM | DIASTOLIC BLOOD PRESSURE: 62 MMHG | WEIGHT: 143 LBS | BODY MASS INDEX: 24.41 KG/M2 | HEIGHT: 64 IN | TEMPERATURE: 97.7 F | SYSTOLIC BLOOD PRESSURE: 100 MMHG

## 2025-05-21 DIAGNOSIS — Z09 HOSPITAL DISCHARGE FOLLOW-UP: Primary | ICD-10-CM

## 2025-05-21 DIAGNOSIS — R10.9 FLANK PAIN: ICD-10-CM

## 2025-05-21 DIAGNOSIS — J44.1 CHRONIC OBSTRUCTIVE PULMONARY DISEASE WITH ACUTE EXACERBATION (MULTI): ICD-10-CM

## 2025-05-21 DIAGNOSIS — R11.0 NAUSEA: ICD-10-CM

## 2025-05-21 DIAGNOSIS — R55 SYNCOPE, UNSPECIFIED SYNCOPE TYPE: ICD-10-CM

## 2025-05-21 DIAGNOSIS — E11.9 TYPE 2 DIABETES MELLITUS WITHOUT COMPLICATION, WITHOUT LONG-TERM CURRENT USE OF INSULIN: ICD-10-CM

## 2025-05-21 DIAGNOSIS — E78.2 MIXED HYPERLIPIDEMIA: ICD-10-CM

## 2025-05-21 DIAGNOSIS — I10 ESSENTIAL (PRIMARY) HYPERTENSION: ICD-10-CM

## 2025-05-21 LAB
POC APPEARANCE, URINE: ABNORMAL
POC BILIRUBIN, URINE: NEGATIVE
POC BLOOD, URINE: ABNORMAL
POC COLOR, URINE: YELLOW
POC GLUCOSE, URINE: NEGATIVE MG/DL
POC KETONES, URINE: NEGATIVE MG/DL
POC LEUKOCYTES, URINE: ABNORMAL
POC NITRITE,URINE: NEGATIVE
POC PH, URINE: 6 PH
POC PROTEIN, URINE: NEGATIVE MG/DL
POC SPECIFIC GRAVITY, URINE: 1.02
POC UROBILINOGEN, URINE: 0.2 EU/DL

## 2025-05-21 PROCEDURE — 1036F TOBACCO NON-USER: CPT | Performed by: FAMILY MEDICINE

## 2025-05-21 PROCEDURE — 1159F MED LIST DOCD IN RCRD: CPT | Performed by: FAMILY MEDICINE

## 2025-05-21 PROCEDURE — 3008F BODY MASS INDEX DOCD: CPT | Performed by: FAMILY MEDICINE

## 2025-05-21 PROCEDURE — 99496 TRANSJ CARE MGMT HIGH F2F 7D: CPT | Performed by: FAMILY MEDICINE

## 2025-05-21 PROCEDURE — 1160F RVW MEDS BY RX/DR IN RCRD: CPT | Performed by: FAMILY MEDICINE

## 2025-05-21 PROCEDURE — 3078F DIAST BP <80 MM HG: CPT | Performed by: FAMILY MEDICINE

## 2025-05-21 PROCEDURE — 3074F SYST BP LT 130 MM HG: CPT | Performed by: FAMILY MEDICINE

## 2025-05-21 PROCEDURE — 81003 URINALYSIS AUTO W/O SCOPE: CPT | Performed by: FAMILY MEDICINE

## 2025-05-21 RX ORDER — ONDANSETRON 4 MG/1
4 TABLET, ORALLY DISINTEGRATING ORAL EVERY 8 HOURS PRN
Qty: 20 TABLET | Refills: 0 | Status: SHIPPED | OUTPATIENT
Start: 2025-05-21 | End: 2025-05-28

## 2025-05-21 ASSESSMENT — ANXIETY QUESTIONNAIRES
2. NOT BEING ABLE TO STOP OR CONTROL WORRYING: NOT AT ALL
1. FEELING NERVOUS, ANXIOUS, OR ON EDGE: NOT AT ALL
3. WORRYING TOO MUCH ABOUT DIFFERENT THINGS: NOT AT ALL
IF YOU CHECKED OFF ANY PROBLEMS ON THIS QUESTIONNAIRE, HOW DIFFICULT HAVE THESE PROBLEMS MADE IT FOR YOU TO DO YOUR WORK, TAKE CARE OF THINGS AT HOME, OR GET ALONG WITH OTHER PEOPLE: NOT DIFFICULT AT ALL
GAD7 TOTAL SCORE: 2
6. BECOMING EASILY ANNOYED OR IRRITABLE: NOT AT ALL
5. BEING SO RESTLESS THAT IT IS HARD TO SIT STILL: NOT AT ALL
4. TROUBLE RELAXING: MORE THAN HALF THE DAYS
7. FEELING AFRAID AS IF SOMETHING AWFUL MIGHT HAPPEN: NOT AT ALL

## 2025-05-21 ASSESSMENT — ENCOUNTER SYMPTOMS
LOSS OF SENSATION IN FEET: 0
DEPRESSION: 0
OCCASIONAL FEELINGS OF UNSTEADINESS: 1

## 2025-05-21 NOTE — PROGRESS NOTES
Subjective   Patient ID: Reyna Jones is a 75 y.o. female who presents today to Follow-Up from Hospitalization for Severe Dizziness, and Vertigo from 05/10/25 - 05/15/2025. I last saw the patient on 3/13/2025.     HPI  Hospital follow up   ALFREDITO complete   Admitted to [facility]  Admission dates 05/15/2025 -- 05/18/2025  Admitted for  Severe Dizziness  6 Days since discharge  Patient was prescribed Dexamethazone 0.1% and Ciprofloxacin 0.3% Eardrops for left ear; Flonase Nasal [place];   Patient was referred to ENT for further evaluation and treatment for Possible vestibular neuritis/left otitis externa   Appointment with ENT scheduled for 06/02/2025 at 12:30 pm    Patient concerned of a UTI, States that she was having flank pains, vomiting, dry heaves, and nauseous. States that she would like to bring in a urine sample.     Patients daughter has a video of mother sleeping and she is having body twitches, concerned of possible mini seizures. I did view the video and it showed evidence of tonic-clonic activity.    Past medical, surgical, and family history reviewed.  Reviewed and documented all medications   Pt eating well, exercising as tolerated and taking medications as directed.      Review of Systems  Except positives as noted in the CC & HPI      Constitutional: Denies fevers, chills, night sweats, fatigue, weight changes, change in appetite    Eyes: Denies blurry vision, double vision    ENT: Denies otalgia, trouble hearing, tinnitus, vertigo, nasal congestion, rhinorrhea, sore throat    Neck: Denies swelling, masses    Cardiovascular: Denies chest pain, palpitations, edema, orthopnea, syncope    Respiratory: Denies dyspnea, cough, wheezing, postural nocturnal dyspnea    Gastrointestinal: Denies abdominal pain, nausea, vomiting, diarrhea, constipation, melena, hematochezia    Genitourinary: Denies dysuria, hematuria  Musculoskeletal: Denies back pain, neck pain, arthralgias, myalgias   "  Integumentary: Denies skin lesions, rashes, masses    Neurological: Denies dizziness, headaches, confusion, limb weakness, paresthesias, syncope, convulsions    Psychiatric: Denies depression, anxiety, homicidal ideations, suicidal ideations, sleep disturbances    Endocrine: Denies polyphagia, polydipsia, polyuria, weakness, hair thinning, heat intolerance, cold intolerance, weight changes    Heme/Lymph: Denies easy bruising, easy bleeding, swollen glands    Objective   Visit Vitals  /62   Pulse 72   Temp 36.5 °C (97.7 °F)   Resp 16   Ht 1.626 m (5' 4\")   Wt 64.9 kg (143 lb)   SpO2 99%   BMI 24.55 kg/m²   OB Status Unknown   Smoking Status Former   BSA 1.71 m²       Physical Exam    Gen. Appearance - well-developed, well-nourished in no acute distress. Patient does walk with a walker.     Skin - warm and dry without rash or concerning lesions.      Mental Status - alert and oriented times 3. Normal mood and affect appropriate to mood.      Neck - supple without lymphadenopathy. Carotid pulses are normal without bruits. Thyroid is normal in midline without nodules.    Ears - Right TM reveals tympanosclerosis, left TM reveals mild retraction and cloudiness and move poorly with insufflation. Ear canals are clear bilaterally.       Chest - lungs are clear to auscultation without rales, rhonchi or wheezes.      Heart - regular, rate, and rhythm without murmurs, rubs or gallops.      Abdomen - soft, flat, nondistended. No masses, hepatomegaly or splenomegaly is noted. No rebound, rigidity or guarding is noted. Bowel sounds are normoactive.      Extremities - no cyanosis, clubbing. Pedal pulses are 2+ normal at the dorsalis pedis and posterior tibial pulses bilaterally.      Neurological - cranial nerves II through XII are grossly intact. Motor strength 5/5 at all fours.     Assessment   1. Hospital discharge follow-up        2. Syncope, unspecified syncope type  EEG    EEG      3. Nausea  ondansetron ODT " (Zofran-ODT) 4 mg disintegrating tablet      4. Flank pain  POCT UA Automated manually resulted    Urinalysis with Reflex Culture and Microscopic    Urinalysis with Reflex Culture and Microscopic      5. Essential (primary) hypertension        6. Mixed hyperlipidemia        7. Type 2 diabetes mellitus without complication, without long-term current use of insulin        8. Chronic obstructive pulmonary disease with acute exacerbation (Multi)            Patient to continue current medications (with any exceptions as noted) and diet. Follow-up in 3 month(s) otherwise as needed.     Ordered EEG to check for seizures. Will call patient with results when available.       Ordered Urinalysis with Reflex Microscopic.. Will call patient with results when available.      Patient was started on:   Ondansetron 4 mg, DISSOLVE 1 TAB IN MOUTH EVERY DAY AS NEEDED    Rx(s) sent to pharmacy.       Please keep all future appointments with Dr. Mobley in cardiology, ENT and Dr. Wise in pulmonology.     Scribe Attestation  By signing my name below, IMelinda , Scribe   attest that this documentation has been prepared under the direction and in the presence of Vamsi Ayoub MD.      This note has been transcribed using a medical scribe and there is a possibility of unintentional typing misprints.     All medical record entries made by the scribe were at my direction and personally dictated by me. I have reviewed the chart and agree that the record accurately reflects my personal performance of the history, physical exam, discussion, and plan.     VAMSI AYOUB M.D.

## 2025-05-22 ENCOUNTER — TELEPHONE (OUTPATIENT)
Dept: PRIMARY CARE | Facility: CLINIC | Age: 75
End: 2025-05-22
Payer: MEDICARE

## 2025-05-22 DIAGNOSIS — R55 SYNCOPE, UNSPECIFIED SYNCOPE TYPE: ICD-10-CM

## 2025-05-22 NOTE — TELEPHONE ENCOUNTER
"Patient and daughter Evy called in stating they are awaiting to schedule EEG still. (Reyna authorized Evy to talk with us).     Order was placed twice, each time it comes through \"Status: Complete, visit scheduled\". It is not allowing us to scheduled.     Should patient be referred to neurology?     Please review and advise.   Update pt and daughter at [phone]  "

## 2025-05-23 ENCOUNTER — PATIENT OUTREACH (OUTPATIENT)
Dept: PRIMARY CARE | Facility: CLINIC | Age: 75
End: 2025-05-23
Payer: MEDICARE

## 2025-05-23 NOTE — PROGRESS NOTES
Confirmation of at least 2 patient identifiers.    Completed telephonic follow-up with patient after recent visit with PCP    Spoke to patient during outreach call.    Patient reports feeling: Improved  Pt states she has to wait until next week for the EEG but other than that everything is doing good.     Patient has questions or concerns about medications: No    Have all prescribed medications been filled? Yes    Patient has necessary resources to manage their care? Yes    Patient has questions or concerns? No    Next care management follow-up approximately within one month.  Care  information provided to patient.

## 2025-05-24 LAB
APPEARANCE UR: ABNORMAL
BACTERIA #/AREA URNS HPF: ABNORMAL /HPF
BACTERIA UR CULT: ABNORMAL
BACTERIA UR CULT: ABNORMAL
BILIRUB UR QL STRIP: NEGATIVE
COLOR UR: YELLOW
GLUCOSE UR QL STRIP: NEGATIVE
HGB UR QL STRIP: ABNORMAL
HYALINE CASTS #/AREA URNS LPF: ABNORMAL /LPF
KETONES UR QL STRIP: NEGATIVE
LEUKOCYTE ESTERASE UR QL STRIP: ABNORMAL
NITRITE UR QL STRIP: NEGATIVE
PH UR STRIP: 5.5 [PH] (ref 5–8)
PROT UR QL STRIP: NEGATIVE
RBC #/AREA URNS HPF: ABNORMAL /HPF
SERVICE CMNT-IMP: ABNORMAL
SP GR UR STRIP: 1.02 (ref 1–1.03)
SQUAMOUS #/AREA URNS HPF: ABNORMAL /HPF
WBC #/AREA URNS HPF: ABNORMAL /HPF

## 2025-05-25 DIAGNOSIS — N39.0 ACUTE UTI: Primary | ICD-10-CM

## 2025-05-25 RX ORDER — AMOXICILLIN 875 MG/1
875 TABLET, COATED ORAL 2 TIMES DAILY
Qty: 20 TABLET | Refills: 0 | Status: SHIPPED | OUTPATIENT
Start: 2025-05-25 | End: 2025-06-04

## 2025-05-26 NOTE — RESULT ENCOUNTER NOTE
Please call the patient regarding her abnormal result.  Urine culture is positive for UTI with group B streptococcus.  A prescription for amoxicillin 875 mg twice daily for 10 days was sent to her local pharmacy.   20

## 2025-05-28 ENCOUNTER — TELEPHONE (OUTPATIENT)
Dept: PRIMARY CARE | Facility: CLINIC | Age: 75
End: 2025-05-28

## 2025-05-28 ENCOUNTER — APPOINTMENT (OUTPATIENT)
Facility: CLINIC | Age: 75
End: 2025-05-28
Payer: MEDICARE

## 2025-05-28 VITALS
WEIGHT: 144.2 LBS | DIASTOLIC BLOOD PRESSURE: 75 MMHG | HEIGHT: 64 IN | RESPIRATION RATE: 20 BRPM | TEMPERATURE: 98.6 F | OXYGEN SATURATION: 94 % | SYSTOLIC BLOOD PRESSURE: 124 MMHG | BODY MASS INDEX: 24.62 KG/M2 | HEART RATE: 70 BPM

## 2025-05-28 DIAGNOSIS — J41.8 MIXED SIMPLE AND MUCOPURULENT CHRONIC BRONCHITIS (MULTI): ICD-10-CM

## 2025-05-28 DIAGNOSIS — J96.11 CHRONIC RESPIRATORY FAILURE WITH HYPOXIA: ICD-10-CM

## 2025-05-28 DIAGNOSIS — F17.211 CIGARETTE NICOTINE DEPENDENCE IN REMISSION: Primary | ICD-10-CM

## 2025-05-28 PROCEDURE — 99214 OFFICE O/P EST MOD 30 MIN: CPT | Performed by: INTERNAL MEDICINE

## 2025-05-28 PROCEDURE — 1159F MED LIST DOCD IN RCRD: CPT | Performed by: INTERNAL MEDICINE

## 2025-05-28 PROCEDURE — 3008F BODY MASS INDEX DOCD: CPT | Performed by: INTERNAL MEDICINE

## 2025-05-28 PROCEDURE — 3078F DIAST BP <80 MM HG: CPT | Performed by: INTERNAL MEDICINE

## 2025-05-28 PROCEDURE — 3074F SYST BP LT 130 MM HG: CPT | Performed by: INTERNAL MEDICINE

## 2025-05-28 RX ORDER — TIOTROPIUM BROMIDE AND OLODATEROL 3.124; 2.736 UG/1; UG/1
2 SPRAY, METERED RESPIRATORY (INHALATION) DAILY
Qty: 4 G | Refills: 11 | Status: SHIPPED | OUTPATIENT
Start: 2025-05-28 | End: 2026-05-23

## 2025-05-28 ASSESSMENT — ENCOUNTER SYMPTOMS
OCCASIONAL FEELINGS OF UNSTEADINESS: 1
LOSS OF SENSATION IN FEET: 0
DEPRESSION: 0

## 2025-05-28 ASSESSMENT — COPD QUESTIONNAIRES
QUESTION3_CHESTTIGHTNESS: 0 - MY CHEST DOES NOT FEEL TIGHT AT ALL
QUESTION4_WALKINCLINE: 1
QUESTION7_SLEEPQUALITY: 0 - I SLEEP SOUNDLY
QUESTION8_ENERGYLEVEL: 1
QUESTION2_CHESTPHLEGM: 3
QUESTION5_HOMEACTIVITIES: 1
QUESTION1_COUGHFREQUENCY: 3
QUESTION6_LEAVINGHOUSE: 1
CAT_TOTALSCORE: 10

## 2025-05-28 NOTE — TELEPHONE ENCOUNTER
Magnolia from [facility] called in to let Dr. Ayoub know that they have started home visits on 5/23, for PT & skilled nursing. She wanted to confirm that Dr. Ayoub will follow & sign orders for home care as needed. Please advise. Ph: [phone]

## 2025-05-29 ENCOUNTER — HOSPITAL ENCOUNTER (OUTPATIENT)
Dept: NEUROLOGY | Facility: HOSPITAL | Age: 75
Discharge: HOME | End: 2025-05-29
Payer: MEDICARE

## 2025-05-29 DIAGNOSIS — R55 SYNCOPE, UNSPECIFIED SYNCOPE TYPE: ICD-10-CM

## 2025-05-29 PROCEDURE — 95816 EEG AWAKE AND DROWSY: CPT

## 2025-05-29 PROCEDURE — 95816 EEG AWAKE AND DROWSY: CPT | Performed by: PSYCHIATRY & NEUROLOGY

## 2025-05-29 NOTE — PROGRESS NOTES
Department of Medicine  Division of Pulmonary, Critical Care, and Sleep Medicine  Consultation  [facility] - [address], [address]    I was asked by Dr. Ayoub, to evaluate Ms. Reyna Jones for COPD. I have independently interviewed and examined the patient in the office and reviewed available records.    Physician HPI:  Mrs. Jones is a 74-year-old female with past medical history of nicotine dependence with 30-pack-year of cigarette smoking, quit smoking in 2017, moderate COPD, paroxysmal atrial fibrillation, coronary artery disease, and HFpEF who presented to the office today to establish care for COPD.    Most recent pulmonary function test was done in April 2024 which revealed moderate range COPD with FEV1 57% of predicted.  DLCO was 70% of predicted.  Reyna has been on Stiolto Ellipta inhaler every day.  She reports occasional cough and wheezing with exertions.  No recent history of acute COPD exacerbation.  Cough is occasionally productive of clear sputum.  No hemoptysis.  No acute fevers or purulent sputum production.    Follow-up 04/14/25:  Reyna reports stable respiratory status since last visit.  She had acute bronchitis about a week ago that resolved after course of Z-Andi.  No interval history of acute COPD exacerbation or ER visits.  Using LABA/LAMA every day.  Did not need to use albuterol.  Denies heartburn.  She is scheduled for low-dose CT scan of chest in 2 weeks from now.    Follow up 05/28/2025:  Reyna presented to the office today to follow up post recent admission at [facility] with Vertigo. She was started on supplemental O2 at night time since then for nocturnal hypoxemia. No acute respiratory symptoms today.  Using Stiolto daily. Denies purulent sputum production or wheezing. Did not need to use Albuterol.   Most recent CT Scan of chest in April 2025 is reviewed today; emphysema + focal area of mucus impaction in segmental airway. No suspicious lung nodules otherwise.       Review  of Systems   All other review of systems are negative and/or non-contributory.      Immunizations:  Immunization History   Administered Date(s) Administered    Flu vaccine, trivalent, preservative free, HIGH-DOSE, age 65y+ (Fluzone) 09/28/2017, 12/04/2018, 12/01/2020, 09/12/2024    Influenza, seasonal, injectable 02/16/2017    Becky SARS-CoV-2 Vaccination 03/17/2021, 11/09/2021    Pneumococcal conjugate vaccine, 13-valent (PREVNAR 13) 02/20/2017, 06/17/2021    Pneumococcal polysaccharide vaccine, 23-valent, age 2 years and older (PNEUMOVAX 23) 02/20/2017, 02/15/2018    Tdap vaccine, age 7 year and older (BOOSTRIX, ADACEL) 09/07/2021    Zoster vaccine, recombinant, adult (SHINGRIX) 06/16/2021, 03/05/2025    Zoster, live 09/08/2014       Past Medical History:  Past Medical History:   Diagnosis Date    Body mass index (BMI) 25.0-25.9, adult 06/10/2019    BMI 25.0-25.9,adult    Encounter for immunization 09/29/2017    Encounter for immunization    Gastrointestinal hemorrhage, unspecified 02/28/2017    Acute GI bleeding    Other conditions influencing health status 12/31/2018    History of cough    Personal history of nicotine dependence     History of tobacco abuse    Personal history of nicotine dependence     History of nicotine dependence    Personal history of other diseases of the circulatory system 04/26/2019    History of atrial fibrillation    Personal history of other diseases of the digestive system 04/21/2017    History of gastrointestinal hemorrhage    Personal history of other diseases of the respiratory system 11/03/2017    History of acute sinusitis    Personal history of other diseases of the respiratory system     History of bronchitis    Unspecified conjunctivitis 10/07/2013    Conjunctivitis of left eye    Unspecified otitis externa, left ear 09/09/2014    Otitis externa, left       Past Surgical History:  Past Surgical History:   Procedure Laterality Date    CARDIAC ELECTROPHYSIOLOGY PROCEDURE N/A  "3/26/2024    Procedure: Cardioversion;  Surgeon: James C Ramicone, DO;  Location: [facility] Cardiac Cath Lab;  Service: Electrophysiology;  Laterality: N/A;    OTHER SURGICAL HISTORY  10/07/2013    Drainage Of Ovarian Cyst(S) - Abdominal Approach    OTHER SURGICAL HISTORY  12/04/2021    Hemorrhoidectomy    OTHER SURGICAL HISTORY  12/04/2021    Ovarian tumor removal    TONSILLECTOMY  09/08/2014    Tonsillectomy With Adenoidectomy       Family History:  Family History   Problem Relation Name Age of Onset    Other (cardiac disorder) Mother      Cancer Father      Rheumatic fever Sister      Stroke Brother      Other (cardiac arrhythemia) Brother      Hypertension Daughter         Social History:  Social History     Tobacco Use    Smoking status: Former     Types: Cigarettes    Smokeless tobacco: Never   Substance Use Topics    Alcohol use: Not Currently    Drug use: Never        Occupational & Environmental History:       Medications:  Current Outpatient Medications   Medication Instructions    amoxicillin (AMOXIL) 875 mg, oral, 2 times daily    CALCIUM-MAGNESIUM-ZINC ORAL 1 capsule, Daily    COQ10, UBIQUINOL, ORAL 200 mg, Daily    cyanocobalamin (VITAMIN B-12) 500 mcg, Daily    furosemide (LASIX) 20 mg, oral, Daily    levothyroxine (SYNTHROID, LEVOXYL) 75 mcg, oral, Daily before breakfast    metFORMIN (OSM) (FORTAMET) 500 mg, Daily with evening meal    metoprolol succinate XL (TOPROL-XL) 50 mg, oral, 2 times daily, Do not crush or chew.    pantoprazole (PROTONIX) 40 mg, oral, Daily    rivaroxaban (XARELTO) 20 mg, oral, Daily with evening meal, Take with food.    rosuvastatin (CRESTOR) 10 mg, Daily    tiotropium-olodateroL (Stiolto Respimat) 2.5-2.5 mcg/actuation mist inhaler 2 Inhalations, inhalation, Daily        Drug Allergies/Intolerances:  Allergies   Allergen Reactions    Propoxyphene N-Acetaminophen Unknown            Visit Vitals  /75   Pulse 70   Temp 37 °C (98.6 °F)   Resp 20   Ht 1.626 m (5' 4\")   Wt 65.4 kg " (144 lb 3.2 oz)   SpO2 94%   BMI 24.75 kg/m²   OB Status Menopausal   Smoking Status Former   BSA 1.72 m²        Physical Exam  Vitals and nursing note reviewed.   Constitutional:       General: She is not in acute distress.     Appearance: Normal appearance.   HENT:      Head: Normocephalic and atraumatic.      Nose: Nose normal.      Mouth/Throat:      Mouth: Mucous membranes are moist.   Eyes:      Conjunctiva/sclera: Conjunctivae normal.   Cardiovascular:      Rate and Rhythm: Normal rate.   Pulmonary:      Effort: Pulmonary effort is normal. No respiratory distress.      Breath sounds: Normal breath sounds. No stridor. No wheezing or rhonchi.   Musculoskeletal:      Cervical back: Neck supple.   Skin:     Coloration: Skin is not jaundiced.   Neurological:      General: No focal deficit present.      Mental Status: She is alert and oriented to person, place, and time. Mental status is at baseline.   Psychiatric:         Mood and Affect: Mood normal.         Behavior: Behavior normal.         Judgment: Judgment normal.          Pulmonary Function Test Results                         Chest Radiograph     No results found for this or any previous visit from the past 2000 days.      Echocardiogram     No results found for this or any previous visit from the past 365 days.       Chest CT Scan     No results found for this or any previous visit from the past 365 days.       Laboratory Studies     Lab Results   Component Value Date    WBC 7.1 06/12/2024    HGB 13.4 06/12/2024    HCT 41.8 06/12/2024    MCV 95 06/12/2024     06/12/2024      Lab Results   Component Value Date    GLUCOSE 108 (H) 09/18/2024    CALCIUM 9.4 09/18/2024     09/18/2024    K 4.0 09/18/2024    CO2 34 (H) 09/18/2024    CL 97 (L) 09/18/2024    BUN 24 (H) 09/18/2024    CREATININE 1.04 09/18/2024      Lab Results   Component Value Date    ALT 10 09/18/2024    AST 22 09/18/2024    ALKPHOS 69 09/18/2024    BILITOT 0.5 09/18/2024           Assessment and Plan / Recommendations     COPD (moderate) with mMRC II  Nicotine dependence in remission  Chronic dyspnea on exertion    Plan:  Continue on Stiolto + Albuterol as needed  Advised to stay up to date with immunizations  LDCT for lung cancer screening in 12 months  Pulmonary rehab is recommended - deferred until next visit. Ongoing physical therapy now after discharge.   F/U in 6 months, sooner if necessary.     Galo Amaral MD  05/28/2025

## 2025-06-12 ENCOUNTER — HOSPITAL ENCOUNTER (OUTPATIENT)
Dept: RADIOLOGY | Facility: CLINIC | Age: 75
Discharge: HOME | End: 2025-06-12
Payer: MEDICARE

## 2025-06-12 DIAGNOSIS — H73.891 OTHER SPECIFIED DISORDERS OF TYMPANIC MEMBRANE, RIGHT EAR: ICD-10-CM

## 2025-06-12 PROCEDURE — 70480 CT ORBIT/EAR/FOSSA W/O DYE: CPT

## 2025-06-12 PROCEDURE — 70480 CT ORBIT/EAR/FOSSA W/O DYE: CPT | Performed by: RADIOLOGY

## 2025-06-25 ENCOUNTER — APPOINTMENT (OUTPATIENT)
Dept: PRIMARY CARE | Facility: CLINIC | Age: 75
End: 2025-06-25
Payer: MEDICARE

## 2025-06-26 ENCOUNTER — TELEPHONE (OUTPATIENT)
Dept: PRIMARY CARE | Facility: CLINIC | Age: 75
End: 2025-06-26
Payer: MEDICARE

## 2025-06-26 NOTE — TELEPHONE ENCOUNTER
Umm from [facility] called in to report the patient is declining home care services. She stated they have been seeing the patient since the beginning of may but the patient feels services are no longer needed

## 2025-06-30 ENCOUNTER — APPOINTMENT (OUTPATIENT)
Dept: PRIMARY CARE | Facility: CLINIC | Age: 75
End: 2025-06-30
Payer: MEDICARE

## 2025-06-30 VITALS
HEIGHT: 64 IN | RESPIRATION RATE: 16 BRPM | WEIGHT: 149 LBS | OXYGEN SATURATION: 97 % | SYSTOLIC BLOOD PRESSURE: 100 MMHG | TEMPERATURE: 97.1 F | BODY MASS INDEX: 25.44 KG/M2 | DIASTOLIC BLOOD PRESSURE: 54 MMHG | HEART RATE: 73 BPM

## 2025-06-30 DIAGNOSIS — E11.42 DIABETIC PERIPHERAL NEUROPATHY (MULTI): ICD-10-CM

## 2025-06-30 DIAGNOSIS — J43.1 PANLOBULAR EMPHYSEMA (MULTI): ICD-10-CM

## 2025-06-30 DIAGNOSIS — E11.9 TYPE 2 DIABETES MELLITUS WITHOUT COMPLICATION, WITHOUT LONG-TERM CURRENT USE OF INSULIN: ICD-10-CM

## 2025-06-30 DIAGNOSIS — E66.3 OVERWEIGHT WITH BODY MASS INDEX (BMI) OF 25 TO 25.9 IN ADULT: ICD-10-CM

## 2025-06-30 DIAGNOSIS — E03.9 ACQUIRED HYPOTHYROIDISM: ICD-10-CM

## 2025-06-30 DIAGNOSIS — H91.92 HEARING LOSS OF LEFT EAR, UNSPECIFIED HEARING LOSS TYPE: ICD-10-CM

## 2025-06-30 DIAGNOSIS — I10 ESSENTIAL (PRIMARY) HYPERTENSION: Primary | ICD-10-CM

## 2025-06-30 DIAGNOSIS — I48.0 PAROXYSMAL ATRIAL FIBRILLATION (MULTI): ICD-10-CM

## 2025-06-30 DIAGNOSIS — E78.2 MIXED HYPERLIPIDEMIA: ICD-10-CM

## 2025-06-30 PROCEDURE — 3078F DIAST BP <80 MM HG: CPT | Performed by: FAMILY MEDICINE

## 2025-06-30 PROCEDURE — 1159F MED LIST DOCD IN RCRD: CPT | Performed by: FAMILY MEDICINE

## 2025-06-30 PROCEDURE — 1036F TOBACCO NON-USER: CPT | Performed by: FAMILY MEDICINE

## 2025-06-30 PROCEDURE — 99214 OFFICE O/P EST MOD 30 MIN: CPT | Performed by: FAMILY MEDICINE

## 2025-06-30 PROCEDURE — 3074F SYST BP LT 130 MM HG: CPT | Performed by: FAMILY MEDICINE

## 2025-06-30 PROCEDURE — 3008F BODY MASS INDEX DOCD: CPT | Performed by: FAMILY MEDICINE

## 2025-06-30 PROCEDURE — 1160F RVW MEDS BY RX/DR IN RCRD: CPT | Performed by: FAMILY MEDICINE

## 2025-06-30 PROCEDURE — G2211 COMPLEX E/M VISIT ADD ON: HCPCS | Performed by: FAMILY MEDICINE

## 2025-06-30 ASSESSMENT — PATIENT HEALTH QUESTIONNAIRE - PHQ9
2. FEELING DOWN, DEPRESSED OR HOPELESS: NOT AT ALL
2. FEELING DOWN, DEPRESSED OR HOPELESS: NOT AT ALL
SUM OF ALL RESPONSES TO PHQ9 QUESTIONS 1 AND 2: 0
SUM OF ALL RESPONSES TO PHQ9 QUESTIONS 1 AND 2: 0
1. LITTLE INTEREST OR PLEASURE IN DOING THINGS: NOT AT ALL
1. LITTLE INTEREST OR PLEASURE IN DOING THINGS: NOT AT ALL

## 2025-06-30 ASSESSMENT — ENCOUNTER SYMPTOMS
OCCASIONAL FEELINGS OF UNSTEADINESS: 0
DEPRESSION: 0
LOSS OF SENSATION IN FEET: 0

## 2025-06-30 NOTE — PROGRESS NOTES
HPI: Reyna Jones is a 75 y.o. female presenting for 1 month follow-up.  I last saw the patient 05/21/2025. Her daughter is with her today.    Chief Complaint   Patient presents with    Diabetes    Hyperlipidemia    Hypertension    GERD    Hypothyroidism     Patient' daughter wonders if she may return to her own home.     Patient does not feel capable of driving at this time.    Patient gained around 5 pounds from her last visit.    Patient has been seeing her ENT specialist and has received corticosteroids injections into her left ear but without any improvement. She wonders if she might get a second opinion. She is to see the ne ENT specialist at the end of July.    Patient is doing okay with her medications.    Patient is following up with Dr. Mobley in Cardiology and her endocrinologist.    Patient has paper work to be filled for disability parking placard.    Past medical, surgical, and family history reviewed.  Reviewed and documented all medications   Pt eating well, exercising as tolerated and taking medications as directed.        ROS    Except positives as noted in the CC & HPI   Constitutional: Denies fevers, chills, night sweats, fatigue, weight changes, change in appetite   Eyes: Denies blurry vision, double vision   ENT: Denies otalgia, trouble hearing, tinnitus, vertigo, nasal congestion, rhinorrhea, sore throat   Neck: Denies swelling, masses   Cardiovascular: Denies chest pain, palpitations, edema, orthopnea, syncope   Respiratory: Denies dyspnea, cough, wheezing, postural nocturnal dyspnea   Gastrointestinal: Denies abdominal pain, nausea, vomiting, diarrhea, constipation, melena, hematochezia   Genitourinary: Denies dysuria, hematuria, frequency, urgency   Musculoskeletal: Denies back pain, neck pain, arthralgias, myalgias   Integumentary: Denies skin lesions, rashes, masses   Neurological: Denies dizziness, headaches, confusion, limb weakness, paresthesias, syncope, convulsions   Psychiatric:  "Denies depression, anxiety, homicidal ideations, suicidal ideations, sleep disturbances   Endocrine: Denies polyphagia, polydipsia, polyuria, weakness, hair thinning, heat intolerance, cold intolerance, weight changes   Heme/Lymph: Denies easy bruising, easy bleeding, swollen glands     Vitals:    06/30/25 1621   BP: 100/54   Pulse: 73   Resp: 16   Temp: 36.2 °C (97.1 °F)   SpO2: 97%   Weight: 67.6 kg (149 lb)   Height: 1.626 m (5' 4\")       PHYSICAL EXAM    Gen. Appearance - well-developed, well-nourished in no acute distress. Patient does walk with a walker.     Skin - warm and dry without rash or concerning lesions.      Mental Status - alert and oriented times 3. Normal mood and affect appropriate to mood.      Neck - supple without lymphadenopathy. Carotid pulses are normal without bruits. Thyroid is normal in midline without nodules.     Ears - Right TM reveals tympanosclerosis, left TM reveals mild retraction/thickening and cloudiness and move poorly with insufflation. Ear canals are clear bilaterally.       Chest - lungs are clear to auscultation without rales, rhonchi or wheezes.      Heart - irregularly irregular, rate, and rhythm without murmurs, rubs or gallops.      Abdomen - soft, flat, nondistended. No masses, hepatomegaly or splenomegaly is noted. No rebound, rigidity or guarding is noted. Bowel sounds are normoactive.      Extremities - no cyanosis, clubbing. Pedal pulses are 2+ normal at the dorsalis pedis and posterior tibial pulses bilaterally.      Neurological - cranial nerves II through XII are grossly intact. Motor strength 5/5 at all fours.       Below is the patient's most recent value for Albumin, ALT, AST, BUN, Calcium, Chloride, Cholesterol, CO2, Creatinine, GFR, Glucose, HDL, Hematocrit, Hemoglobin, Hemoglobin A1C, LDL, Magnesium, Phosphorus, Platelets, Potassium, PSA, Sodium, Triglycerides, and WBC.   Lab Results   Component Value Date    ALBUMIN 4.6 09/18/2024    ALT 10 09/18/2024    " AST 22 09/18/2024    BUN 24 (H) 09/18/2024    CALCIUM 9.4 09/18/2024    CL 97 (L) 09/18/2024    CHOL 174 09/18/2024    CO2 34 (H) 09/18/2024    CREATININE 1.04 09/18/2024    HDL 48.5 09/18/2024    HCT 41.8 06/12/2024    HGB 13.4 06/12/2024    HGBA1C 6.6 (H) 09/18/2024    MG 1.70 05/27/2021    PHOS 4.9 08/02/2023     06/12/2024    K 4.0 09/18/2024     09/18/2024    TRIG 177 (H) 09/18/2024    WBC 7.1 06/12/2024         ASSESSMENT:  1. Essential (primary) hypertension        2. Mixed hyperlipidemia        3. Paroxysmal atrial fibrillation (Multi)        4. Acquired hypothyroidism        5. Type 2 diabetes mellitus without complication, without long-term current use of insulin        6. Diabetic peripheral neuropathy (Multi)        7. Hearing loss of left ear, unspecified hearing loss type        8. Panlobular emphysema (Multi)        9. Overweight with body mass index (BMI) of 25 to 25.9 in adult            PLAN:  Patient to continue current medications (with any exceptions as noted) and diet. Follow-up in 6 month(s) otherwise as needed.     Patient may return to her home with a wheeled walker and LIFE ALERT button.    Consider referral to Dr. Zheng if she is not happy with her ear situation after her next visit with Dr. Pak.    Paper work filled for parking placard.    Please keep all future appointments with Dr. Mobley in cardiology, ENT and Dr. Wise in pulmonology.     Scribe Attestation  By signing my name below, I, Brock Sanchez   attest that this documentation has been prepared under the direction and in the presence of Jeffery Ayoub MD.      This note has been transcribed using a medical scribe and there is a possibility of unintentional typing misprints.     All medical record entries made by the scribe were at my direction and personally dictated by me. I have reviewed the chart and agree that the record accurately reflects my personal performance of the history, physical exam,  discussion, and plan.     VAMSI OSEGUERA M.D.

## 2025-06-30 NOTE — LETTER
June 30, 2025     Patient: Reyna Jones   YOB: 1950   Date of Visit: 6/30/2025       To Whom It May Concern:    It is my medical opinion that Ms. Jones requires a disability parking placard for the following reasons:  She cannot walk 200 feet without stopping to rest.  Duration of need: 5 years. (06/30/2030).    If you have any questions or concerns, please don't hesitate to call.         Sincerely,          Jeffery Ayoub MD

## 2025-07-14 ENCOUNTER — HOSPITAL ENCOUNTER (EMERGENCY)
Facility: HOSPITAL | Age: 75
Discharge: HOME | End: 2025-07-14
Payer: MEDICARE

## 2025-07-14 ENCOUNTER — OFFICE VISIT (OUTPATIENT)
Dept: ORTHOPEDIC SURGERY | Facility: CLINIC | Age: 75
End: 2025-07-14
Payer: MEDICARE

## 2025-07-14 ENCOUNTER — APPOINTMENT (OUTPATIENT)
Dept: RADIOLOGY | Facility: HOSPITAL | Age: 75
End: 2025-07-14
Payer: MEDICARE

## 2025-07-14 VITALS
OXYGEN SATURATION: 95 % | RESPIRATION RATE: 18 BRPM | SYSTOLIC BLOOD PRESSURE: 93 MMHG | WEIGHT: 144 LBS | BODY MASS INDEX: 24.59 KG/M2 | HEIGHT: 64 IN | TEMPERATURE: 97.5 F | DIASTOLIC BLOOD PRESSURE: 54 MMHG | HEART RATE: 85 BPM

## 2025-07-14 DIAGNOSIS — R21 RASH: ICD-10-CM

## 2025-07-14 DIAGNOSIS — S82.892A CLOSED FRACTURE OF LEFT ANKLE, INITIAL ENCOUNTER: Primary | ICD-10-CM

## 2025-07-14 DIAGNOSIS — S82.839A CLOSED FRACTURE OF DISTAL END OF FIBULA, UNSPECIFIED FRACTURE MORPHOLOGY, UNSPECIFIED LATERALITY, INITIAL ENCOUNTER: Primary | ICD-10-CM

## 2025-07-14 PROCEDURE — L4361 PNEUMA/VAC WALK BOOT PRE OTS: HCPCS | Performed by: INTERNAL MEDICINE

## 2025-07-14 PROCEDURE — 99204 OFFICE O/P NEW MOD 45 MIN: CPT | Performed by: INTERNAL MEDICINE

## 2025-07-14 PROCEDURE — 27786 TREATMENT OF ANKLE FRACTURE: CPT | Performed by: INTERNAL MEDICINE

## 2025-07-14 PROCEDURE — 73564 X-RAY EXAM KNEE 4 OR MORE: CPT | Mod: LT

## 2025-07-14 PROCEDURE — 99213 OFFICE O/P EST LOW 20 MIN: CPT | Mod: 25 | Performed by: INTERNAL MEDICINE

## 2025-07-14 PROCEDURE — 73610 X-RAY EXAM OF ANKLE: CPT | Mod: LT

## 2025-07-14 PROCEDURE — 73110 X-RAY EXAM OF WRIST: CPT | Mod: LEFT SIDE | Performed by: STUDENT IN AN ORGANIZED HEALTH CARE EDUCATION/TRAINING PROGRAM

## 2025-07-14 PROCEDURE — 29515 APPLICATION SHORT LEG SPLINT: CPT | Mod: LT

## 2025-07-14 PROCEDURE — E0143 WALKER FOLDING WHEELED W/O S: HCPCS | Performed by: INTERNAL MEDICINE

## 2025-07-14 PROCEDURE — 73610 X-RAY EXAM OF ANKLE: CPT | Mod: LEFT SIDE | Performed by: STUDENT IN AN ORGANIZED HEALTH CARE EDUCATION/TRAINING PROGRAM

## 2025-07-14 PROCEDURE — 99284 EMERGENCY DEPT VISIT MOD MDM: CPT | Mod: 25

## 2025-07-14 PROCEDURE — 73110 X-RAY EXAM OF WRIST: CPT | Mod: LT

## 2025-07-14 PROCEDURE — 2500000004 HC RX 250 GENERAL PHARMACY W/ HCPCS (ALT 636 FOR OP/ED): Performed by: PHYSICIAN ASSISTANT

## 2025-07-14 PROCEDURE — 73564 X-RAY EXAM KNEE 4 OR MORE: CPT | Mod: LEFT SIDE | Performed by: STUDENT IN AN ORGANIZED HEALTH CARE EDUCATION/TRAINING PROGRAM

## 2025-07-14 PROCEDURE — 96372 THER/PROPH/DIAG INJ SC/IM: CPT | Mod: 59 | Performed by: PHYSICIAN ASSISTANT

## 2025-07-14 RX ORDER — BACITRACIN ZINC 500 UNIT/G
1 OINTMENT (GRAM) TOPICAL 2 TIMES DAILY
Qty: 28 G | Refills: 0 | Status: SHIPPED | OUTPATIENT
Start: 2025-07-14 | End: 2025-07-24

## 2025-07-14 RX ORDER — FENTANYL CITRATE 50 UG/ML
50 INJECTION, SOLUTION INTRAMUSCULAR; INTRAVENOUS ONCE
Status: COMPLETED | OUTPATIENT
Start: 2025-07-14 | End: 2025-07-14

## 2025-07-14 RX ORDER — NALOXONE HYDROCHLORIDE 4 MG/.1ML
4 SPRAY NASAL AS NEEDED
Qty: 2 EACH | Refills: 0 | Status: SHIPPED | OUTPATIENT
Start: 2025-07-14

## 2025-07-14 RX ORDER — ONDANSETRON 4 MG/1
4 TABLET, ORALLY DISINTEGRATING ORAL ONCE
Status: COMPLETED | OUTPATIENT
Start: 2025-07-14 | End: 2025-07-14

## 2025-07-14 RX ORDER — HYDROCODONE BITARTRATE AND ACETAMINOPHEN 5; 325 MG/1; MG/1
1 TABLET ORAL EVERY 8 HOURS PRN
Qty: 9 TABLET | Refills: 0 | Status: SHIPPED | OUTPATIENT
Start: 2025-07-14 | End: 2025-07-17

## 2025-07-14 RX ADMIN — FENTANYL CITRATE 50 MCG: 50 INJECTION INTRAMUSCULAR; INTRAVENOUS at 10:44

## 2025-07-14 RX ADMIN — ONDANSETRON 4 MG: 4 TABLET, ORALLY DISINTEGRATING ORAL at 10:43

## 2025-07-14 ASSESSMENT — LIFESTYLE VARIABLES
EVER HAD A DRINK FIRST THING IN THE MORNING TO STEADY YOUR NERVES TO GET RID OF A HANGOVER: NO
HAVE YOU EVER FELT YOU SHOULD CUT DOWN ON YOUR DRINKING: NO
EVER FELT BAD OR GUILTY ABOUT YOUR DRINKING: NO
HAVE PEOPLE ANNOYED YOU BY CRITICIZING YOUR DRINKING: NO
TOTAL SCORE: 0

## 2025-07-14 ASSESSMENT — PAIN DESCRIPTION - LOCATION: LOCATION: ANKLE

## 2025-07-14 ASSESSMENT — PAIN - FUNCTIONAL ASSESSMENT: PAIN_FUNCTIONAL_ASSESSMENT: 0-10

## 2025-07-14 ASSESSMENT — PAIN SCALES - GENERAL: PAINLEVEL_OUTOF10: 10 - WORST POSSIBLE PAIN

## 2025-07-14 NOTE — PROGRESS NOTES
Acute Injury New Patient Visit    CC: No chief complaint on file.      HPI: Reyna is a 75 y.o. female presents today for evaluation for acute left ankle injury sustained yesterday after she slipped and fell at home. She was evaluated in the ER where x-rays were taken. She is here for initial evaluation.         Review of Systems   GENERAL: Negative for malaise, significant weight loss, fever  MUSCULOSKELETAL: See HPI  NEURO:  Negative for numbness / tingling     Past Medical History  Medical History[1]    Medication review  Medication Documentation Review Audit       Reviewed by PATTIE Etienne (Nurse Practitioner) on 07/14/25 at 1017      Medication Order Taking? Sig Documenting Provider Last Dose Status   CALCIUM-MAGNESIUM-ZINC ORAL [id number]  Take 1 capsule by mouth once daily. Historical Provider, MD  Active   COQ10, UBIQUINOL, ORAL [id number]  Take 200 mg by mouth once daily. Historical Provider, MD  Active   cyanocobalamin (Vitamin B-12) 500 mcg tablet [id number]  Take 1 tablet (500 mcg) by mouth once daily. Historical Provider, MD  Active   furosemide (Lasix) 20 mg tablet [id number]  Take 1 tablet (20 mg) by mouth once daily. Alfred Mobley MD  Active   levothyroxine (Synthroid, Levoxyl) 75 mcg tablet [id number]  Take 1 tablet (75 mcg) by mouth once daily in the morning. Take before meals. PATTIE Echavarria  Active   metFORMIN, OSM, (Fortamet) 500 mg 24 hr tablet [id number]  Take 1 tablet (500 mg) by mouth once daily in the evening. Take with meals. Do not crush, chew, or split. Historical Provider, MD  Active   metoprolol succinate XL (Toprol-XL) 50 mg 24 hr tablet [id number]  Take 1 tablet (50 mg) by mouth 2 times a day. Do not crush or chew. Alfred Mobley MD  Active   pantoprazole (ProtoNix) 40 mg EC tablet [id number]  Take 1 tablet (40 mg) by mouth once daily. Alfred Mobley MD  Active   rivaroxaban (Xarelto) 20 mg tablet [id number]  Take 1 tablet (20 mg) by mouth once daily in the  evening. Take with meals. Take with food. Alfred Mobley MD  Active   rosuvastatin (Crestor) 10 mg tablet [id number]  Take 1 tablet (10 mg) by mouth once daily. Historical Provider, MD  Active   tiotropium-olodateroL (Stiolto Respimat) 2.5-2.5 mcg/actuation mist inhaler [id number]  Inhale 2 Inhalations once daily. Galo Amaral MD  Active                    Allergies  RX Allergies[2]    Social History  Social History     Socioeconomic History    Marital status:      Spouse name: Not on file    Number of children: Not on file    Years of education: Not on file    Highest education level: Not on file   Occupational History    Not on file   Tobacco Use    Smoking status: Former     Types: Cigarettes    Smokeless tobacco: Never   Substance and Sexual Activity    Alcohol use: Not Currently    Drug use: Never    Sexual activity: Defer   Other Topics Concern    Not on file   Social History Narrative    Not on file     Social Drivers of Health     Financial Resource Strain: Not on file   Food Insecurity: Not on file   Transportation Needs: Not on file   Physical Activity: Not on file   Stress: Not on file   Social Connections: Not on file   Intimate Partner Violence: Not on file   Housing Stability: Not on file       Surgical History  Surgical History[3]    Physical Exam:  GENERAL:  Patient is awake, alert, and oriented to person place and time.  Patient appears well nourished and well kept.  Affect Calm, Not Acutely Distressed.  HEENT:  Normocephalic, Atraumatic, EOMI  CARDIOVASCULAR:  Hemodynamically stable.  RESPIRATORY:  Normal respirations with unlabored breathing.  Extremity: Left ankle shows skin is intact.  There is no erythema or warmth.  Swelling localized on the lateral aspect.  There is no clinical signs of infection.  There is  pain over the lateral malleolus, and distal fibula.  There is no pain of the medial malleolus.  There is no pain over the ATF, CF or PTF ligament.  There is no pain over  the deltoid ligament.  No pain over the Achilles tendon.  Negative Butler's test.  Negative squeeze test.  Negative anterior drawer test.  Negative talar tilt test.  No pain over the anterior process of the talus.  There is no pain over the talar dome.  There is no pain at the base of the fifth metatarsal bone.  No pain of the calcaneus.  No pain over the plantar aponeurosis.  No pain of the midfoot.  Neurovascularly intact.      Diagnostics: X-rays reviewed  XR wrist left 3+ views  Narrative: Interpreted By:  Marcel Chavis,   STUDY:  XR WRIST LEFT 3+ VIEWS; ;  7/14/2025 11:29 am      INDICATION:  Signs/Symptoms:Fall.      COMPARISON:  None.      ACCESSION NUMBER(S):  [id number]      ORDERING CLINICIAN:  ADDIS JALLOH      FINDINGS:  No acute fracture or dislocation. Joint spaces are maintained. No  significant soft tissue swelling.      Impression: No acute fracture or dislocation.          MACRO:  None      Signed by: Marcel Chavis 7/14/2025 12:00 PM  Dictation workstation:   [id number]  XR ankle left 3+ views  Narrative: Interpreted By:  Marcel Chavis,   STUDY:  XR ANKLE LEFT 3+ VIEWS; ;  7/14/2025 11:29 am      INDICATION:  Signs/Symptoms:Fall, pain.      COMPARISON:  None.      ACCESSION NUMBER(S):  [id number]      ORDERING CLINICIAN:  ADDIS JALLOH      FINDINGS:  Mildly displaced comminuted oblique fracture of the distal fibula.  Ankle mortise is maintained. Moderate ankle soft tissue swelling,  most prominent at the lateral aspect.      Impression: Mildly displaced comminuted fracture distal fibula with soft tissue  swelling.          Signed by: Marcel Chavis 7/14/2025 11:58 AM  Dictation workstation:   [id number]  XR knee left 4+ views  Narrative: Interpreted By:  Marcel Chavis,   STUDY:  XR KNEE LEFT 4+ VIEWS; ;  7/14/2025 11:29 am      INDICATION:  Signs/Symptoms:Fall.      COMPARISON:  None.      ACCESSION NUMBER(S):  [id number]      ORDERING CLINICIAN:  ADDIS JALLOH       FINDINGS:  Mild medial joint space narrowing. No acute fracture or dislocation.  No significant joint effusion. No soft tissue abnormality.      Impression: No acute findings.          MACRO:  None      Signed by: Marcel Partha 7/14/2025 11:56 AM  Dictation workstation:   [id number]      Procedure: None    Assessment: Acute nondisplaced oblique distal fibula fracture of the left ankle    Plan: Reyna presents today for evaluation for acute left ankle injury sustained yesterday after she slipped and fell at home. She was evaluated in the ER where x-rays were taken, and found to have a nondisplaced oblique distal fibular fracture. We recommended nonsurgical treatment with a walker and either a short leg walking cast or short fracture boot for support, patient elected for the fracture boot. She will follow-up in 2-3 weeks, we will repeat x-rays of the left ankle 3 views, AP, lateral, and oblique views.  Pain is well-controlled.     No orders of the defined types were placed in this encounter.     At the conclusion of the visit there were no further questions by the patient/family regarding their plan of care.  Patient was instructed to call or return with any issues, questions, or concerns regarding their injury and/or treatment plan described above.     07/14/25 at 3:07 PM - Jono Hadley MD  Scribe Attestation  By signing my name below, Nader HENAO Scribe   attest that this documentation has been prepared under the direction and in the presence of Jono Hadley MD.    Office: [phone]    We already utilize the scribe attestation, Nader HENAO am scribing for, and in the presence of Dr. Hadley.    Jono HENAO MD personally performed the services described in the documentation as scribed by Nader Gilman in my presence, and confirm it is both accurate and complete.    This note was prepared using voice recognition software.  The details of this note are correct and have been  reviewed, and corrected to the best of my ability.  Some grammatical errors may persist related to the Dragon software.         [1]   Past Medical History:  Diagnosis Date    Body mass index (BMI) 25.0-25.9, adult 06/10/2019    BMI 25.0-25.9,adult    Encounter for immunization 09/29/2017    Encounter for immunization    Gastrointestinal hemorrhage, unspecified 02/28/2017    Acute GI bleeding    Other conditions influencing health status 12/31/2018    History of cough    Personal history of nicotine dependence     History of tobacco abuse    Personal history of nicotine dependence     History of nicotine dependence    Personal history of other diseases of the circulatory system 04/26/2019    History of atrial fibrillation    Personal history of other diseases of the digestive system 04/21/2017    History of gastrointestinal hemorrhage    Personal history of other diseases of the respiratory system 11/03/2017    History of acute sinusitis    Personal history of other diseases of the respiratory system     History of bronchitis    Unspecified conjunctivitis 10/07/2013    Conjunctivitis of left eye    Unspecified otitis externa, left ear 09/09/2014    Otitis externa, left   [2]   Allergies  Allergen Reactions    Propoxyphene N-Acetaminophen Unknown   [3]   Past Surgical History:  Procedure Laterality Date    CARDIAC ELECTROPHYSIOLOGY PROCEDURE N/A 3/26/2024    Procedure: Cardioversion;  Surgeon: James C Ramicone, DO;  Location: [facility] Cardiac Cath Lab;  Service: Electrophysiology;  Laterality: N/A;    OTHER SURGICAL HISTORY  10/07/2013    Drainage Of Ovarian Cyst(S) - Abdominal Approach    OTHER SURGICAL HISTORY  12/04/2021    Hemorrhoidectomy    OTHER SURGICAL HISTORY  12/04/2021    Ovarian tumor removal    TONSILLECTOMY  09/08/2014    Tonsillectomy With Adenoidectomy

## 2025-07-14 NOTE — ED PROVIDER NOTES
HPI   Chief Complaint   Patient presents with    Fall     L ankle and L knee pain, on thinner, but no head injury    Rash       A 75-year-old female patient comes into the emergency department today with complaints of left knee, left ankle pain.  She states that yesterday she had finished watering her plants outside was on the deck and it got wet and she slipped falling forward onto her knee and injured her ankle.  She denies hitting her head or loss consciousness.  States she has some bruising to her knee and ankle with some swelling therefore she came to the emergency department today she rates pain a 10 out of 10 on the pain scale.  She also states this week she developed a dry dark rash to her lower extremities.  States is mildly itchy.  She otherwise has no complaints at present time.              Patient History   Medical History[1]  Surgical History[2]  Family History[3]  Social History[4]    Physical Exam   ED Triage Vitals [07/14/25 1018]   Temperature Heart Rate Respirations BP   36.2 °C (97.2 °F) 85 18 103/75      Pulse Ox Temp Source Heart Rate Source Patient Position   94 % Temporal -- Sitting      BP Location FiO2 (%)     Right arm --       Physical Exam  Constitutional:       General: She is in acute distress.      Appearance: Normal appearance. She is not ill-appearing or diaphoretic.   HENT:      Head: Normocephalic and atraumatic.      Nose: Nose normal.   Eyes:      Extraocular Movements: Extraocular movements intact.      Conjunctiva/sclera: Conjunctivae normal.      Pupils: Pupils are equal, round, and reactive to light.   Cardiovascular:      Rate and Rhythm: Normal rate and regular rhythm.   Pulmonary:      Effort: Pulmonary effort is normal. No respiratory distress.      Breath sounds: Normal breath sounds. No stridor. No wheezing.   Musculoskeletal:         General: Swelling (Left ankle) and tenderness (Left lateral medial ankle, left patellar region of the knee) present. Normal range of  motion.      Cervical back: Normal range of motion.   Skin:     General: Skin is warm and dry.      Findings: Bruising (Left anterior knee, left lateral medial ankle, dorsal left wrist) present.   Neurological:      General: No focal deficit present.      Mental Status: She is alert and oriented to person, place, and time. Mental status is at baseline.   Psychiatric:         Mood and Affect: Mood normal.           ED Course & MDM   Diagnoses as of 07/14/25 1213   Rash   Closed fracture of left ankle, initial encounter                 No data recorded     [place] Coma Scale Score: 15 (07/14/25 1033 : Dawn Sturgeon, RN)                           Medical Decision Making  A 75-year-old female patient comes into the emergency department today with complaints of left knee, left ankle pain.  She states that yesterday she had finished watering her plants outside was on the deck and it got wet and she slipped falling forward onto her knee and injured her ankle.  She denies hitting her head or loss consciousness.  States she has some bruising to her knee and ankle with some swelling therefore she came to the emergency department today she rates pain a 10 out of 10 on the pain scale.  She also states this week she developed a dry dark rash to her lower extremities.  States is mildly itchy.  She otherwise has no complaints at present time.    X-ray of the left knee, left ankle, left wrist ordered to rule out any acute bony abnormalities or fractures.  IM fentanyl p.o. Zofran ordered for patient pain and potential nausea secondary to narcotic pain medication.    Patient has negative left knee x-rays left wrist x-ray has been positive comminuted fracture of the distal fibula of the left ankle.  While patient placed in a posterior short leg splint and get patient close follow-up with orthopedics will also get patient follow-up with dermatology regarding her rash.  Patient agrees with this plan expressed full verbal understanding.   Questions were answered.    Historian is the patient    Diagnosis: Left fibular fracture, macular rash    The correct placement of the splint/strap was confirmed.  Any necessary adjustments were made.  The patient´s neurovascular status is intact pre and post placement.      I personally supervised and inspected the splint/strap which was applied by ancillary staff      . The extremity is appropriately immobilized. Patient neurovascularly intact before and after the splint application.        Labs Reviewed - No data to display     XR knee left 4+ views   Final Result   No acute findings.             MACRO:   None        Signed by: Marcel Chavis 7/14/2025 11:56 AM   Dictation workstation:   [id number]      XR ankle left 3+ views   Final Result   Mildly displaced comminuted fracture distal fibula with soft tissue   swelling.             Signed by: Marcel Chavis 7/14/2025 11:58 AM   Dictation workstation:   [id number]      XR wrist left 3+ views   Final Result   No acute fracture or dislocation.             MACRO:   None        Signed by: Marcel Chavis 7/14/2025 12:00 PM   Dictation workstation:   [id number]          Procedure  Procedures       Bradly Mg PA-C  07/14/25 1213         [1]   Past Medical History:  Diagnosis Date    Body mass index (BMI) 25.0-25.9, adult 06/10/2019    BMI 25.0-25.9,adult    Encounter for immunization 09/29/2017    Encounter for immunization    Gastrointestinal hemorrhage, unspecified 02/28/2017    Acute GI bleeding    Other conditions influencing health status 12/31/2018    History of cough    Personal history of nicotine dependence     History of tobacco abuse    Personal history of nicotine dependence     History of nicotine dependence    Personal history of other diseases of the circulatory system 04/26/2019    History of atrial fibrillation    Personal history of other diseases of the digestive system 04/21/2017    History of gastrointestinal hemorrhage    Personal  history of other diseases of the respiratory system 11/03/2017    History of acute sinusitis    Personal history of other diseases of the respiratory system     History of bronchitis    Unspecified conjunctivitis 10/07/2013    Conjunctivitis of left eye    Unspecified otitis externa, left ear 09/09/2014    Otitis externa, left   [2]   Past Surgical History:  Procedure Laterality Date    CARDIAC ELECTROPHYSIOLOGY PROCEDURE N/A 3/26/2024    Procedure: Cardioversion;  Surgeon: James C Ramicone, DO;  Location: [facility] Cardiac Cath Lab;  Service: Electrophysiology;  Laterality: N/A;    OTHER SURGICAL HISTORY  10/07/2013    Drainage Of Ovarian Cyst(S) - Abdominal Approach    OTHER SURGICAL HISTORY  12/04/2021    Hemorrhoidectomy    OTHER SURGICAL HISTORY  12/04/2021    Ovarian tumor removal    TONSILLECTOMY  09/08/2014    Tonsillectomy With Adenoidectomy   [3]   Family History  Problem Relation Name Age of Onset    Other (cardiac disorder) Mother      Cancer Father      Rheumatic fever Sister      Stroke Brother      Other (cardiac arrhythemia) Brother      Hypertension Daughter     [4]   Social History  Tobacco Use    Smoking status: Former     Types: Cigarettes    Smokeless tobacco: Never   Substance Use Topics    Alcohol use: Not Currently    Drug use: Never        Bradly Mg PA-C  07/14/25 1220

## 2025-07-29 ENCOUNTER — APPOINTMENT (OUTPATIENT)
Dept: ORTHOPEDIC SURGERY | Facility: CLINIC | Age: 75
End: 2025-07-29
Payer: MEDICARE

## 2025-07-29 ENCOUNTER — ANCILLARY PROCEDURE (OUTPATIENT)
Dept: ORTHOPEDIC SURGERY | Facility: CLINIC | Age: 75
End: 2025-07-29
Payer: MEDICARE

## 2025-07-29 DIAGNOSIS — S82.839A CLOSED FRACTURE OF DISTAL END OF FIBULA, UNSPECIFIED FRACTURE MORPHOLOGY, UNSPECIFIED LATERALITY, INITIAL ENCOUNTER: ICD-10-CM

## 2025-07-29 PROCEDURE — 1159F MED LIST DOCD IN RCRD: CPT | Performed by: INTERNAL MEDICINE

## 2025-07-29 PROCEDURE — 99024 POSTOP FOLLOW-UP VISIT: CPT | Performed by: INTERNAL MEDICINE

## 2025-07-29 PROCEDURE — 73610 X-RAY EXAM OF ANKLE: CPT | Mod: LEFT SIDE | Performed by: INTERNAL MEDICINE

## 2025-07-29 NOTE — PROGRESS NOTES
CC:   No chief complaint on file.      HPI: Reyna is a 75 y.o. female presents today for follow-up for 2-week follow-up for nondisplaced oblique distal fibula fracture of the left ankle. She states that she is doing well. Repeat x-rays today. She is currently wearing the fracture boot.         Review of Systems   GENERAL: Negative for malaise, significant weight loss, fever  MUSCULOSKELETAL: See HPI  NEURO:  Negative for numbness / tingling     Past Medical History  Medical History[1]    Medication review  Medication Documentation Review Audit       Reviewed by PATTIE Etienne (Nurse Practitioner) on 07/14/25 at 1017      Medication Order Taking? Sig Documenting Provider Last Dose Status   CALCIUM-MAGNESIUM-ZINC ORAL [id number]  Take 1 capsule by mouth once daily. Historical Provider, MD  Active   COQ10, UBIQUINOL, ORAL [id number]  Take 200 mg by mouth once daily. Historical Provider, MD  Active   cyanocobalamin (Vitamin B-12) 500 mcg tablet [id number]  Take 1 tablet (500 mcg) by mouth once daily. Historical Provider, MD  Active   furosemide (Lasix) 20 mg tablet [id number]  Take 1 tablet (20 mg) by mouth once daily. Alfred Mobley MD  Active   levothyroxine (Synthroid, Levoxyl) 75 mcg tablet [id number]  Take 1 tablet (75 mcg) by mouth once daily in the morning. Take before meals. PATTIE Echavarria  Active   metFORMIN, OSM, (Fortamet) 500 mg 24 hr tablet [id number]  Take 1 tablet (500 mg) by mouth once daily in the evening. Take with meals. Do not crush, chew, or split. Historical Provider, MD  Active   metoprolol succinate XL (Toprol-XL) 50 mg 24 hr tablet [id number]  Take 1 tablet (50 mg) by mouth 2 times a day. Do not crush or chew. Alfred Mobley MD  Active   pantoprazole (ProtoNix) 40 mg EC tablet [id number]  Take 1 tablet (40 mg) by mouth once daily. Alfred Mobley MD  Active   rivaroxaban (Xarelto) 20 mg tablet [id number]  Take 1 tablet (20 mg) by mouth once daily in the evening. Take  with meals. Take with food. Alfred Mobley MD  Active   rosuvastatin (Crestor) 10 mg tablet [id number]  Take 1 tablet (10 mg) by mouth once daily. Historical Provider, MD  Active   tiotropium-olodateroL (Stiolto Respimat) 2.5-2.5 mcg/actuation mist inhaler [id number]  Inhale 2 Inhalations once daily. Galo Amaral MD  Active                    Allergies  RX Allergies[2]    Social History  Social History     Socioeconomic History    Marital status:      Spouse name: Not on file    Number of children: Not on file    Years of education: Not on file    Highest education level: Not on file   Occupational History    Not on file   Tobacco Use    Smoking status: Former     Types: Cigarettes    Smokeless tobacco: Never   Substance and Sexual Activity    Alcohol use: Not Currently    Drug use: Never    Sexual activity: Defer   Other Topics Concern    Not on file   Social History Narrative    Not on file     Social Drivers of Health     Financial Resource Strain: Not on file   Food Insecurity: Not on file   Transportation Needs: Not on file   Physical Activity: Not on file   Stress: Not on file   Social Connections: Not on file   Intimate Partner Violence: Not on file   Housing Stability: Not on file       Surgical History  Surgical History[3]    Physical Exam:  GENERAL:  Patient is awake, alert, and oriented to person place and time.  Patient appears well nourished and well kept.  Affect Calm, Not Acutely Distressed.  HEENT:  Normocephalic, Atraumatic, EOMI  CARDIOVASCULAR:  Hemodynamically stable.  RESPIRATORY:  Normal respirations with unlabored breathing.  Extremity: Left ankle shows skin is intact.  There is no erythema or warmth.  Improved swelling localized on the lateral aspect.  There is no clinical signs of infection.  There is moderate pain over the lateral malleolus, and distal fibula.  There is no pain of the medial malleolus.  There is no pain over the ATF, CF or PTF ligament.  There is no pain  over the deltoid ligament.  No pain over the Achilles tendon.  Negative Butler's test.  Negative squeeze test.  Negative anterior drawer test.  Negative talar tilt test.  No pain over the anterior process of the talus.  There is no pain over the talar dome.  There is no pain at the base of the fifth metatarsal bone.  No pain of the calcaneus.  No pain over the plantar aponeurosis.  No pain of the midfoot.  Neurovascularly intact.       Diagnostics: X-rays reviewed        Procedure: None    Assessment: Nondisplaced oblique distal fibula fracture of the left ankle     Plan: Reyna  presents today for follow-up for reevaluation for nondisplaced oblique distal fibula fracture of the left ankle. She is clinically doing better. Repeat x-rays were reviewed She will continue with the walking fracture boot, she may take up to shower and skin care and gentle passive range of motion. She will follow-up in 3-4 weeks, we will repeat x-rays of the left ankle 3 views, AP, lateral, and oblique views.  May consider lace up ankle brace or physical therapy at that time, pending repeat x-rays.    No orders of the defined types were placed in this encounter.     At the conclusion of the visit there were no further questions by the patient/family regarding their plan of care.  Patient was instructed to call or return with any issues, questions, or concerns regarding their injury and/or treatment plan described above.     07/29/25 at 10:02 AM - Jono Hadley MD  Scribe Attestation  By signing my name below, Nader HENAO Scribe   attest that this documentation has been prepared under the direction and in the presence of Jono Hadley MD.    Office: [phone]    We already utilize the scribe attestation, Nader HENAO am scribing for, and in the presence of Dr. Hadley.    Jono HENAO MD personally performed the services described in the documentation as scribed by Nader Gilman in my presence, and confirm it  is both accurate and complete.    This note was prepared using voice recognition software.  The details of this note are correct and have been reviewed, and corrected to the best of my ability.  Some grammatical errors may persist related to the Dragon software.         [1]   Past Medical History:  Diagnosis Date    Body mass index (BMI) 25.0-25.9, adult 06/10/2019    BMI 25.0-25.9,adult    Encounter for immunization 09/29/2017    Encounter for immunization    Gastrointestinal hemorrhage, unspecified 02/28/2017    Acute GI bleeding    Other conditions influencing health status 12/31/2018    History of cough    Personal history of nicotine dependence     History of tobacco abuse    Personal history of nicotine dependence     History of nicotine dependence    Personal history of other diseases of the circulatory system 04/26/2019    History of atrial fibrillation    Personal history of other diseases of the digestive system 04/21/2017    History of gastrointestinal hemorrhage    Personal history of other diseases of the respiratory system 11/03/2017    History of acute sinusitis    Personal history of other diseases of the respiratory system     History of bronchitis    Unspecified conjunctivitis 10/07/2013    Conjunctivitis of left eye    Unspecified otitis externa, left ear 09/09/2014    Otitis externa, left   [2]   Allergies  Allergen Reactions    Propoxyphene N-Acetaminophen Unknown   [3]   Past Surgical History:  Procedure Laterality Date    CARDIAC ELECTROPHYSIOLOGY PROCEDURE N/A 3/26/2024    Procedure: Cardioversion;  Surgeon: James C Ramicone, DO;  Location: [facility];  Service: Electrophysiology;  Laterality: N/A;    OTHER SURGICAL HISTORY  10/07/2013    Drainage Of Ovarian Cyst(S) - Abdominal Approach    OTHER SURGICAL HISTORY  12/04/2021    Hemorrhoidectomy    OTHER SURGICAL HISTORY  12/04/2021    Ovarian tumor removal    TONSILLECTOMY  09/08/2014    Tonsillectomy With Adenoidectomy

## 2025-08-19 ENCOUNTER — APPOINTMENT (OUTPATIENT)
Dept: ORTHOPEDIC SURGERY | Facility: CLINIC | Age: 75
End: 2025-08-19
Payer: MEDICARE

## 2025-08-19 ENCOUNTER — ANCILLARY PROCEDURE (OUTPATIENT)
Dept: ORTHOPEDIC SURGERY | Facility: CLINIC | Age: 75
End: 2025-08-19
Payer: MEDICARE

## 2025-08-19 DIAGNOSIS — S82.839A CLOSED FRACTURE OF DISTAL END OF FIBULA, UNSPECIFIED FRACTURE MORPHOLOGY, UNSPECIFIED LATERALITY, INITIAL ENCOUNTER: Primary | ICD-10-CM

## 2025-08-19 DIAGNOSIS — S82.839A CLOSED FRACTURE OF DISTAL END OF FIBULA, UNSPECIFIED FRACTURE MORPHOLOGY, UNSPECIFIED LATERALITY, INITIAL ENCOUNTER: ICD-10-CM

## 2025-08-19 DIAGNOSIS — M25.562 LEFT KNEE PAIN, UNSPECIFIED CHRONICITY: ICD-10-CM

## 2025-08-19 PROCEDURE — 1159F MED LIST DOCD IN RCRD: CPT | Performed by: INTERNAL MEDICINE

## 2025-08-19 PROCEDURE — 99213 OFFICE O/P EST LOW 20 MIN: CPT | Performed by: INTERNAL MEDICINE

## 2025-08-19 PROCEDURE — 73562 X-RAY EXAM OF KNEE 3: CPT | Mod: LEFT SIDE | Performed by: INTERNAL MEDICINE

## 2025-08-19 PROCEDURE — 99024 POSTOP FOLLOW-UP VISIT: CPT | Performed by: INTERNAL MEDICINE

## 2025-08-19 PROCEDURE — L1902 AFO ANKLE GAUNTLET PRE OTS: HCPCS | Performed by: INTERNAL MEDICINE

## 2025-08-19 PROCEDURE — 73610 X-RAY EXAM OF ANKLE: CPT | Mod: LEFT SIDE | Performed by: INTERNAL MEDICINE

## 2025-09-16 ENCOUNTER — APPOINTMENT (OUTPATIENT)
Dept: PRIMARY CARE | Facility: CLINIC | Age: 75
End: 2025-09-16
Payer: MEDICARE

## 2025-09-23 ENCOUNTER — APPOINTMENT (OUTPATIENT)
Dept: ORTHOPEDIC SURGERY | Facility: CLINIC | Age: 75
End: 2025-09-23
Payer: MEDICARE

## 2025-10-14 ENCOUNTER — APPOINTMENT (OUTPATIENT)
Dept: CARDIOLOGY | Facility: CLINIC | Age: 75
End: 2025-10-14
Payer: MEDICARE

## 2025-10-20 ENCOUNTER — APPOINTMENT (OUTPATIENT)
Facility: CLINIC | Age: 75
End: 2025-10-20
Payer: MEDICARE

## 2025-12-30 ENCOUNTER — APPOINTMENT (OUTPATIENT)
Dept: PRIMARY CARE | Facility: CLINIC | Age: 75
End: 2025-12-30
Payer: MEDICARE

## (undated) DEVICE — ELECTRODE, MULTIFUNCTION, QUICK-COMBO, EDGE SYSTEM, 2 FT